# Patient Record
Sex: MALE | Race: WHITE | Employment: FULL TIME | URBAN - METROPOLITAN AREA
[De-identification: names, ages, dates, MRNs, and addresses within clinical notes are randomized per-mention and may not be internally consistent; named-entity substitution may affect disease eponyms.]

---

## 2021-01-07 ENCOUNTER — HOSPITAL ENCOUNTER (INPATIENT)
Age: 56
LOS: 2 days | Discharge: HOME | DRG: 605 | End: 2021-01-09
Attending: EMERGENCY MEDICINE | Admitting: FAMILY MEDICINE
Payer: COMMERCIAL

## 2021-01-07 ENCOUNTER — APPOINTMENT (OUTPATIENT)
Dept: CT IMAGING | Age: 56
DRG: 605 | End: 2021-01-07
Payer: COMMERCIAL

## 2021-01-07 ENCOUNTER — APPOINTMENT (OUTPATIENT)
Dept: GENERAL RADIOLOGY | Age: 56
DRG: 605 | End: 2021-01-07
Payer: COMMERCIAL

## 2021-01-07 DIAGNOSIS — S01.01XA LACERATION OF SCALP, INITIAL ENCOUNTER: ICD-10-CM

## 2021-01-07 DIAGNOSIS — R55 SYNCOPE AND COLLAPSE: ICD-10-CM

## 2021-01-07 DIAGNOSIS — S09.90XA INJURY OF HEAD, INITIAL ENCOUNTER: Primary | ICD-10-CM

## 2021-01-07 LAB
ALBUMIN SERPL-MCNC: 4.4 G/DL (ref 3.5–5.2)
ALP BLD-CCNC: 56 U/L (ref 40–129)
ALT SERPL-CCNC: 21 U/L (ref 0–40)
ANION GAP SERPL CALCULATED.3IONS-SCNC: 16 MMOL/L (ref 7–16)
AST SERPL-CCNC: 21 U/L (ref 0–39)
BASOPHILS ABSOLUTE: 0.06 E9/L (ref 0–0.2)
BASOPHILS RELATIVE PERCENT: 0.9 % (ref 0–2)
BILIRUB SERPL-MCNC: 1 MG/DL (ref 0–1.2)
BUN BLDV-MCNC: 9 MG/DL (ref 6–20)
CALCIUM SERPL-MCNC: 9.1 MG/DL (ref 8.6–10.2)
CHLORIDE BLD-SCNC: 103 MMOL/L (ref 98–107)
CHP ED QC CHECK: YES
CO2: 20 MMOL/L (ref 22–29)
CREAT SERPL-MCNC: 1.1 MG/DL (ref 0.7–1.2)
D DIMER: 251 NG/ML DDU
EKG ATRIAL RATE: 72 BPM
EKG P AXIS: 76 DEGREES
EKG P-R INTERVAL: 160 MS
EKG Q-T INTERVAL: 430 MS
EKG QRS DURATION: 98 MS
EKG QTC CALCULATION (BAZETT): 470 MS
EKG R AXIS: 86 DEGREES
EKG T AXIS: 47 DEGREES
EKG VENTRICULAR RATE: 72 BPM
EOSINOPHILS ABSOLUTE: 0.08 E9/L (ref 0.05–0.5)
EOSINOPHILS RELATIVE PERCENT: 1.2 % (ref 0–6)
GFR AFRICAN AMERICAN: >60
GFR NON-AFRICAN AMERICAN: >60 ML/MIN/1.73
GLUCOSE BLD-MCNC: 123 MG/DL
GLUCOSE BLD-MCNC: 134 MG/DL (ref 74–99)
HCT VFR BLD CALC: 48.5 % (ref 37–54)
HEMOGLOBIN: 17.4 G/DL (ref 12.5–16.5)
IMMATURE GRANULOCYTES #: 0.07 E9/L
IMMATURE GRANULOCYTES %: 1 % (ref 0–5)
LYMPHOCYTES ABSOLUTE: 2.13 E9/L (ref 1.5–4)
LYMPHOCYTES RELATIVE PERCENT: 31.1 % (ref 20–42)
MAGNESIUM: 2.2 MG/DL (ref 1.6–2.6)
MCH RBC QN AUTO: 34.1 PG (ref 26–35)
MCHC RBC AUTO-ENTMCNC: 35.9 % (ref 32–34.5)
MCV RBC AUTO: 95.1 FL (ref 80–99.9)
METER GLUCOSE: 123 MG/DL (ref 74–99)
MONOCYTES ABSOLUTE: 0.67 E9/L (ref 0.1–0.95)
MONOCYTES RELATIVE PERCENT: 9.8 % (ref 2–12)
NEUTROPHILS ABSOLUTE: 3.83 E9/L (ref 1.8–7.3)
NEUTROPHILS RELATIVE PERCENT: 56 % (ref 43–80)
PDW BLD-RTO: 11.7 FL (ref 11.5–15)
PLATELET # BLD: 209 E9/L (ref 130–450)
PMV BLD AUTO: 10.6 FL (ref 7–12)
POTASSIUM SERPL-SCNC: 3.6 MMOL/L (ref 3.5–5)
PRO-BNP: 77 PG/ML (ref 0–125)
RBC # BLD: 5.1 E12/L (ref 3.8–5.8)
SODIUM BLD-SCNC: 139 MMOL/L (ref 132–146)
TOTAL PROTEIN: 6.5 G/DL (ref 6.4–8.3)
TROPONIN: <0.01 NG/ML (ref 0–0.03)
TROPONIN: <0.01 NG/ML (ref 0–0.03)
TSH SERPL DL<=0.05 MIU/L-ACNC: 1.08 UIU/ML (ref 0.27–4.2)
WBC # BLD: 6.8 E9/L (ref 4.5–11.5)

## 2021-01-07 PROCEDURE — 84443 ASSAY THYROID STIM HORMONE: CPT

## 2021-01-07 PROCEDURE — 83735 ASSAY OF MAGNESIUM: CPT

## 2021-01-07 PROCEDURE — 85378 FIBRIN DEGRADE SEMIQUANT: CPT

## 2021-01-07 PROCEDURE — 2060000000 HC ICU INTERMEDIATE R&B

## 2021-01-07 PROCEDURE — 71045 X-RAY EXAM CHEST 1 VIEW: CPT

## 2021-01-07 PROCEDURE — 72125 CT NECK SPINE W/O DYE: CPT

## 2021-01-07 PROCEDURE — 36415 COLL VENOUS BLD VENIPUNCTURE: CPT

## 2021-01-07 PROCEDURE — 83880 ASSAY OF NATRIURETIC PEPTIDE: CPT

## 2021-01-07 PROCEDURE — 6370000000 HC RX 637 (ALT 250 FOR IP): Performed by: FAMILY MEDICINE

## 2021-01-07 PROCEDURE — APPSS60 APP SPLIT SHARED TIME 46-60 MINUTES: Performed by: NURSE PRACTITIONER

## 2021-01-07 PROCEDURE — 96374 THER/PROPH/DIAG INJ IV PUSH: CPT

## 2021-01-07 PROCEDURE — 6360000002 HC RX W HCPCS: Performed by: EMERGENCY MEDICINE

## 2021-01-07 PROCEDURE — 82962 GLUCOSE BLOOD TEST: CPT

## 2021-01-07 PROCEDURE — 93005 ELECTROCARDIOGRAM TRACING: CPT | Performed by: EMERGENCY MEDICINE

## 2021-01-07 PROCEDURE — 99222 1ST HOSP IP/OBS MODERATE 55: CPT | Performed by: INTERNAL MEDICINE

## 2021-01-07 PROCEDURE — G0378 HOSPITAL OBSERVATION PER HR: HCPCS

## 2021-01-07 PROCEDURE — 80053 COMPREHEN METABOLIC PANEL: CPT

## 2021-01-07 PROCEDURE — 90715 TDAP VACCINE 7 YRS/> IM: CPT | Performed by: EMERGENCY MEDICINE

## 2021-01-07 PROCEDURE — 2580000003 HC RX 258: Performed by: EMERGENCY MEDICINE

## 2021-01-07 PROCEDURE — 84484 ASSAY OF TROPONIN QUANT: CPT

## 2021-01-07 PROCEDURE — 0HQ0XZZ REPAIR SCALP SKIN, EXTERNAL APPROACH: ICD-10-PCS | Performed by: EMERGENCY MEDICINE

## 2021-01-07 PROCEDURE — 99285 EMERGENCY DEPT VISIT HI MDM: CPT

## 2021-01-07 PROCEDURE — 90471 IMMUNIZATION ADMIN: CPT | Performed by: EMERGENCY MEDICINE

## 2021-01-07 PROCEDURE — 70450 CT HEAD/BRAIN W/O DYE: CPT

## 2021-01-07 PROCEDURE — 12002 RPR S/N/AX/GEN/TRNK2.6-7.5CM: CPT

## 2021-01-07 PROCEDURE — 85025 COMPLETE CBC W/AUTO DIFF WBC: CPT

## 2021-01-07 RX ORDER — POLYETHYLENE GLYCOL 3350 17 G/17G
17 POWDER, FOR SOLUTION ORAL DAILY PRN
Status: DISCONTINUED | OUTPATIENT
Start: 2021-01-07 | End: 2021-01-09 | Stop reason: HOSPADM

## 2021-01-07 RX ORDER — SODIUM CHLORIDE 0.9 % (FLUSH) 0.9 %
10 SYRINGE (ML) INJECTION PRN
Status: DISCONTINUED | OUTPATIENT
Start: 2021-01-07 | End: 2021-01-09 | Stop reason: HOSPADM

## 2021-01-07 RX ORDER — M-VIT,TX,IRON,MINS/CALC/FOLIC 27MG-0.4MG
1 TABLET ORAL DAILY
COMMUNITY

## 2021-01-07 RX ORDER — ONDANSETRON 2 MG/ML
4 INJECTION INTRAMUSCULAR; INTRAVENOUS ONCE
Status: COMPLETED | OUTPATIENT
Start: 2021-01-07 | End: 2021-01-07

## 2021-01-07 RX ORDER — SODIUM CHLORIDE 0.9 % (FLUSH) 0.9 %
10 SYRINGE (ML) INJECTION EVERY 12 HOURS SCHEDULED
Status: DISCONTINUED | OUTPATIENT
Start: 2021-01-07 | End: 2021-01-09 | Stop reason: HOSPADM

## 2021-01-07 RX ORDER — 0.9 % SODIUM CHLORIDE 0.9 %
1000 INTRAVENOUS SOLUTION INTRAVENOUS ONCE
Status: COMPLETED | OUTPATIENT
Start: 2021-01-07 | End: 2021-01-07

## 2021-01-07 RX ORDER — LIDOCAINE HYDROCHLORIDE AND EPINEPHRINE 10; 10 MG/ML; UG/ML
20 INJECTION, SOLUTION INFILTRATION; PERINEURAL ONCE
Status: DISCONTINUED | OUTPATIENT
Start: 2021-01-07 | End: 2021-01-09 | Stop reason: HOSPADM

## 2021-01-07 RX ORDER — ACETAMINOPHEN 650 MG/1
650 SUPPOSITORY RECTAL EVERY 6 HOURS PRN
Status: DISCONTINUED | OUTPATIENT
Start: 2021-01-07 | End: 2021-01-09 | Stop reason: HOSPADM

## 2021-01-07 RX ORDER — ACETAMINOPHEN 325 MG/1
650 TABLET ORAL EVERY 6 HOURS PRN
Status: DISCONTINUED | OUTPATIENT
Start: 2021-01-07 | End: 2021-01-09 | Stop reason: HOSPADM

## 2021-01-07 RX ORDER — ONDANSETRON 2 MG/ML
4 INJECTION INTRAMUSCULAR; INTRAVENOUS EVERY 6 HOURS PRN
Status: DISCONTINUED | OUTPATIENT
Start: 2021-01-07 | End: 2021-01-09 | Stop reason: HOSPADM

## 2021-01-07 RX ORDER — PROMETHAZINE HYDROCHLORIDE 25 MG/1
12.5 TABLET ORAL EVERY 6 HOURS PRN
Status: DISCONTINUED | OUTPATIENT
Start: 2021-01-07 | End: 2021-01-09 | Stop reason: HOSPADM

## 2021-01-07 RX ADMIN — ONDANSETRON 4 MG: 2 INJECTION INTRAMUSCULAR; INTRAVENOUS at 11:03

## 2021-01-07 RX ADMIN — TETANUS TOXOID, REDUCED DIPHTHERIA TOXOID AND ACELLULAR PERTUSSIS VACCINE, ADSORBED 0.5 ML: 5; 2.5; 8; 8; 2.5 SUSPENSION INTRAMUSCULAR at 10:52

## 2021-01-07 RX ADMIN — SODIUM CHLORIDE 1000 ML: 9 INJECTION, SOLUTION INTRAVENOUS at 11:03

## 2021-01-07 RX ADMIN — ACETAMINOPHEN 650 MG: 325 TABLET ORAL at 18:25

## 2021-01-07 ASSESSMENT — PAIN SCALES - GENERAL: PAINLEVEL_OUTOF10: 5

## 2021-01-07 NOTE — ED NOTES
Received patient via EMS from work. Patient arriving AAOx3, respirations unlabored and even. C-collar intact. Per EMS, patient standing along side his truck when co-workers noticed that patient \"stiffened\" then fell backwards hitting the back of his head. Unknown LOC time. Upon EMS arrival, patient AAOx2, laceration with bleeding to back of head. While en route, EMS reports vomiting x 1. 18G IV to LAC PTA    Upon assessment, patient AAOx3, states he remembers he was standing next truck to refuel. He does not recall events afterwards. Vomiting x2 in room. MD notified. Patient changed into gown, placed on monitor. Labs collected.  Awaiting CT        Court Chance RN  01/07/21 [id number]

## 2021-01-07 NOTE — CONSULTS
Inpatient Cardiology Consultation      Reason for Consult:  Syncope    Consulting Physician: Dr. Kaci Brewster     Requesting Physician:  Dr. Ewa Blood    Date of Consultation: 1/7/2021    HISTORY OF PRESENT ILLNESS:   Mr. Gerard Herrmann is a 54year old male who is not known to [facility]. Denies following with cardiology in the past. Patient lives in [facility] and works in [employer] is a [employer]ig[employer] . Patient denies prior history of hypertension, hyperlipidemia, diabetes mellitus, chronic kidney disease, history of blood clots, or known CAD. Patient reported in his 25s he frequently had lightheadedness when standing from a sitting position with loss of consciousness, lasting seconds but contributed to drinking \"too much caffeine. \"  Over the past year he reported having 2-3 episodes of near syncope occurring after he got to work. He reported doing his normal routine eating breakfast, drinking a cup of coffee and using chewing tobacco prior to work. Once he got to work he jumps out of the car and reported walking around the car feeling like a \"lightheaded that my eyes felt like they were constantly blinking and I was unable to focus\" usually resolving within seconds to minutes, no loss of consciousness. However, he woke up on 1/7/2021 and reported eating a normal breakfast, driving to work while drinking 1 cup of coffee and prior to getting out of his truck he put in chewing tobacco.  When he got out of the truck he initially felt fine, but was walking around the truck and suddenly felt lightheaded \"felt a blinking sensation where he was under able to focus\" and as he was turning to walk around the other side of the truck he has no recollection of falling to the ground. The next thing he remembers is waking up in the ambulance. He remembers EMS asking him multiple questions and felt \"incoherent\" and was \"confused. \"  Therefore he presented to  [facility] on 1/7/2021 further evaluation and management. Patient had no loss of bowel or bladder and no prior complaints of chest pain, shortness of breath, fever, cough, chills, nausea or vomiting. Upon arrival in the ED: Blood pressure 132/81, heart rate 72, afebrile, 98% on room air. Labs: Sodium 139, potassium 3.6, BUN 9, creatinine 1.1, blood glucose 134, troponin negative x1, WBC 6.8, H/H stable, platelet count 636. Chest x-ray: 1/7/2021 chronic changes with COPD, there are no focal infiltrates  CT head without contrast: No acute intracranial abnormality. CT cervical spine no acute abnormality of cervical spine, did patient and changes (please see HPI) NA 1.2 cm right thyroid nodule. EKG: Normal sinus rhythm, incomplete right bundle branch block, rate 72 bpm, QT/QTc 430/470 ms. Patient was noted to have a 4 cm scalp laceration. He was given a 1 L IV fluid bolus. He was admitted to a telemetry monitoring unit. Telemetry monitoring showing sinus bradycardia with a heart rate in the 50s. Currently is complaining of the side of the bed and appears to be in no acute distress. Denies chest pain shortness of breath. He is alert and oriented x3 and answers questions appropriately. Orthostatic BP: 1/7/2021 1501  Blood pressure lying 116/83, heart rate 54  Blood pressure sitting 120/87, heart rate 55  Blood pressure standing 122/88, heart rate 62      Please note: past medical records were reviewed per electronic medical record (EMR) - see detailed reports under Past Medical/ Surgical History. Past Medical History:    1. Tobacco use: chews 1 can of snuff per day x 6 months and 1 can every other day x 6 months. Quit cigarettes  25 years ago ( 15 pack year smoker). 2. Hx of Migraine HA: previously on Imitrex. Last reported Migraine 1 year ago. 3. Gluten intolerance / Vegetarian diet. 4.  COPD on chest x-ray. 5.  DDD with multiple disc bulges and C3-T1 with osteophytes noted on CT cervical spine. 6. Family history of premature CAD.    7. History of sinus bradycardia    Medications Prior to admit:  Prior to Admission medications    Medication Sig Start Date End Date Taking? Authorizing Provider   Multiple Vitamins-Minerals (THERAPEUTIC MULTIVITAMIN-MINERALS) tablet Take 1 tablet by mouth daily   Yes Historical Provider, MD       Current Medications:    Current Facility-Administered Medications: lidocaine-EPINEPHrine 1 percent-1:[id number] injection 20 mL, 20 mL, Intradermal, Once  sodium chloride flush 0.9 % injection 10 mL, 10 mL, Intravenous, 2 times per day  sodium chloride flush 0.9 % injection 10 mL, 10 mL, Intravenous, PRN  enoxaparin (LOVENOX) injection 40 mg, 40 mg, Subcutaneous, Daily  promethazine (PHENERGAN) tablet 12.5 mg, 12.5 mg, Oral, Q6H PRN **OR** ondansetron (ZOFRAN) injection 4 mg, 4 mg, Intravenous, Q6H PRN  polyethylene glycol (GLYCOLAX) packet 17 g, 17 g, Oral, Daily PRN  acetaminophen (TYLENOL) tablet 650 mg, 650 mg, Oral, Q6H PRN **OR** acetaminophen (TYLENOL) suppository 650 mg, 650 mg, Rectal, Q6H PRN  perflutren lipid microspheres (DEFINITY) injection 1.65 mg, 1.5 mL, Intravenous, ONCE PRN    Allergies:  Patient has no known allergies. Social History:    Works full-time as a semigasoline . Denies cocaine, marijuana and heroin use  He uses chewing tobacco: X1 year  He is a former smoker cigarette smoker: Quit 25 years ago, 15 pack years. Denies alcohol use  Caffeine intake: 1 to 2 cups of coffee per day, usually 12 ounce regular Coke. Family History: Mother: History of scarlet fever, valve replacement  Brother: History of hypertension and  in his 45s, reason of death unknown --family found him down days later. Father: History of MI (early 35s) s/p stents, history of heavy alcohol and tobacco use. REVIEW OF SYSTEMS:     · Constitutional: Denies fatigue, fevers, chills or night sweats  · Eyes: See HPI. · ENT: Denies headaches or hearing loss. No mouth sores or sore throat.  No epistaxis · Cardiovascular: Denies chest pain, pressure or palpitations. No lower extremity swelling. · Respiratory: Denies ESQUEDA, cough, orthopnea or PND. No hemoptysis   · Gastrointestinal: Denies hematemesis or anorexia. No hematochezia or melena    · Genitourinary: Denies urgency, dysuria or hematuria. · Musculoskeletal: Denies gait disturbance, weakness or joint complaints  · Integumentary: Denies rash, hives or pruritis   · Neurological: See HPI. · Psychiatric: Denies anxiety or depression. · Endocrine: Denies temperature intolerance. No recent weight change. .  · Hematologic/Lymphatic: Denies abnormal bruising or bleeding. No swollen lymph nodes    PHYSICAL EXAM:   /83   Pulse 52   Temp 97.7 °F (36.5 °C) (Temporal)   Resp 18   Ht 5' 11\" (1.803 m)   Wt 150 lb (68 kg)   SpO2 98%   BMI 20.92 kg/m²   CONST:  Well developed, well nourished middle-aged  male who appears of stated age. Awake, alert and cooperative. No apparent distress. HEENT:   Head- Normocephalic. +scalp laceration. Eyes- Conjunctivae pink, anicteric  Throat- Oral mucosa pink and moist  Neck-  No stridor, trachea midline, no jugular venous distention. No carotid bruit. CHEST: Chest symmetrical and non-tender to palpation. No accessory muscle use or intercostal retractions  RESPIRATORY: Lung sounds - clear throughout fields   CARDIOVASCULAR:     Heart Inspection- shows no noted pulsations  Heart Palpation- no heaves or thrills; PMI is non-displaced   Heart Ausculation- Regular rate and rhythm, no murmur. No s3, s4 or rub   PV: No lower extremity edema. No varicosities. Pedal pulses palpable, no clubbing or cyanosis   ABDOMEN: Soft, non-tender to light palpation. Bowel sounds present. No palpable masses no organomegaly; no abdominal bruit  MS: Good muscle strength and tone. No atrophy or abnormal movements. : Deferred  SKIN: Warm and dry no statis dermatitis or ulcers   NEURO / PSYCH: Oriented to person, place and time. Speech clear and appropriate. Follows all commands. Pleasant affect     DATA:    ECG: Please see HPI   tele strips: Sinus bradycardia, rate 50's. Diagnostic:      Intake/Output Summary (Last 24 hours) at 1/7/2021 1514  Last data filed at 1/7/2021 1257  Gross per 24 hour   Intake 1000 ml   Output --   Net 1000 ml       Labs:   CBC:   Recent Labs     01/07/21  1036   WBC 6.8   HGB 17.4*   HCT 48.5        BMP:   Recent Labs     01/07/21  1037      K 3.6   CO2 20*   BUN 9   CREATININE 1.1   LABGLOM >60   CALCIUM 9.1     Mag: No results for input(s): MG in the last 72 hours. Phos: No results for input(s): PHOS in the last 72 hours. TSH: No results for input(s): TSH in the last 72 hours. HgA1c: No results found for: LABA1C  No results found for: EAG  proBNP: No results for input(s): PROBNP in the last 72 hours. PT/INR: No results for input(s): PROTIME, INR in the last 72 hours. APTT:No results for input(s): APTT in the last 72 hours. CARDIAC ENZYMES:  Recent Labs     01/07/21  1037   TROPONINI <0.01     FASTING LIPID PANEL:No results found for: CHOL, HDL, LDLDIRECT, LDLCALC, TRIG  LIVER PROFILE:  Recent Labs     01/07/21  1037   AST 21   ALT 21   LABALBU 4.4       Chest x-ray: 1/7/2021  Chronic changes with COPD, no focal infiltrates. CT head without contrast: 1/7/2021  No intracranial abnormality. CT cervical spine: 1/7/2021:  No acute abnormality of the cervical spine. Diffuse degenerative changes are identified from C3-T1 with osteophytes and multilevel disc bulges. 1.2 cm right thyroid nodule is noted. Assessment:  1. Syncope, unwitnessed fall. Orthostatic BP negative. No evidence of prolonged QT on EKG. ?  Dehydration. 2.  Sinus bradycardia  3. Incomplete right bundle branch block  4. History of migraine headaches  5. Tobacco use -uses chewing tobacco/former cigarette smoker  6. Family history premature CAD    Plan:  1. Monitor telemetry  2. Check lipid panel  3.   Check TSH, Urine Drug Screen  4. D-dimer pending  5.  Echocardiogram to assess LV function and VHD  6. Exercise MPS scheduled on 1/8/2021  7. Consider fluid hydration  8. Maintain adequate hydration. Drink approximately 1-2 L of noncaffeinated  Beverage/day. 9.  Recommend 30-day event monitor upon discharge  10. Further recommendations pending above clinical course    Above assessment and plan discussed with Dr. Sunil Jorgensen.   Electronically signed by SILVER Edwards CNP on 1/7/21 at 4:43 PM EST

## 2021-01-07 NOTE — ED PROVIDER NOTES
Department of Emergency Medicine   ED  Provider Note  Admit Date/RoomTime: 1/7/2021 10:26 AM  ED Room: 09/09          History of Present Illness:  1/7/21, Time: 10:42 AM EST  Chief Complaint   Patient presents with    Fall     pt at work and fell backwards. Per EMS pt \"body stiffened\" and he fell back hitting the back of his head. Lac to back of head. Shwetha Lion is a 54 y.o. male presenting to the ED for syncope. Patient states he was at work. Apparently his body suddenly stiffened up, then he fell backwards and struck his head, there is loss of conscious, he is on no anticoagulation. There are no prodromal symptoms. There is no reported seizure activity. He did not bite his tongue, did not lose control of his bowel or bladder, has never happened before. Denies chest pain, back pain, fever, chills, blurred vision, paresthesias, ab pain, change in bowel or bladder, or any other symptoms. Review of Systems:   Pertinent positives and negatives are stated within HPI, all other systems reviewed and are negative.        --------------------------------------------- PAST HISTORY ---------------------------------------------  Past Medical History:  has no past medical history on file. Past Surgical History:  has no past surgical history on file. Social History:  reports that he has quit smoking. His smokeless tobacco use includes snuff. He reports previous alcohol use. He reports that he does not use drugs. Family History: family history is not on file. . Unless otherwise noted, family history is non contributory    The patients home medications have been reviewed. Allergies: Patient has no known allergies.         ---------------------------------------------------PHYSICAL EXAM--------------------------------------    Constitutional/General: Alert and oriented x3  Head: Normocephalic and atraumatic  Eyes: PERRL, EOMI, sclera non icteric  Mouth: Oropharynx clear, handling secretions, no trismus, no asymmetry of the posterior oropharynx or uvular edema  Neck: Supple, full ROM, no stridor, no meningeal signs  Respiratory: Lungs clear to auscultation bilaterally, no wheezes, rales, or rhonchi. Not in respiratory distress  Cardiovascular:  Regular rate. Regular rhythm. 2+ distal pulses. Equal extremity pulses. Chest: No chest wall tenderness  GI:  Abdomen Soft, Non tender, Non distended. No rebound, guarding, or rigidity. No pulsatile masses. Musculoskeletal: Moves all extremities x 4. Warm and well perfused, no clubbing, cyanosis, or edema. Capillary refill <3 seconds  Integument: skin warm and dry. No rashes. Neurologic: GCS 15, no focal deficits, symmetric strength 5/5 in the upper and lower extremities bilaterally  Psychiatric: Normal Affect          -------------------------------------------------- RESULTS -------------------------------------------------  I have personally reviewed all laboratory and imaging results for this patient. Results are listed below.      LABS: (Lab results interpreted by me)  Results for orders placed or performed during the hospital encounter of 01/07/21   CBC Auto Differential   Result Value Ref Range    WBC 6.8 4.5 - 11.5 E9/L    RBC 5.10 3.80 - 5.80 E12/L    Hemoglobin 17.4 (H) 12.5 - 16.5 g/dL    Hematocrit 48.5 37.0 - 54.0 %    MCV 95.1 80.0 - 99.9 fL    MCH 34.1 26.0 - 35.0 pg    MCHC 35.9 (H) 32.0 - 34.5 %    RDW 11.7 11.5 - 15.0 fL    Platelets 441 972 - 844 E9/L    MPV 10.6 7.0 - 12.0 fL    Neutrophils % 56.0 43.0 - 80.0 %    Immature Granulocytes % 1.0 0.0 - 5.0 %    Lymphocytes % 31.1 20.0 - 42.0 %    Monocytes % 9.8 2.0 - 12.0 %    Eosinophils % 1.2 0.0 - 6.0 %    Basophils % 0.9 0.0 - 2.0 %    Neutrophils Absolute 3.83 1.80 - 7.30 E9/L    Immature Granulocytes # 0.07 E9/L    Lymphocytes Absolute 2.13 1.50 - 4.00 E9/L    Monocytes Absolute 0.67 0.10 - 0.95 E9/L    Eosinophils Absolute 0.08 0.05 - 0.50 E9/L    Basophils Absolute 0.06 0.00 - 0.20 E9/L   Comprehensive Metabolic Panel   Result Value Ref Range    Sodium 139 132 - 146 mmol/L    Potassium 3.6 3.5 - 5.0 mmol/L    Chloride 103 98 - 107 mmol/L    CO2 20 (L) 22 - 29 mmol/L    Anion Gap 16 7 - 16 mmol/L    Glucose 134 (H) 74 - 99 mg/dL    BUN 9 6 - 20 mg/dL    CREATININE 1.1 0.7 - 1.2 mg/dL    GFR Non-African American >60 >=60 mL/min/1.73    GFR African American >60     Calcium 9.1 8.6 - 10.2 mg/dL    Total Protein 6.5 6.4 - 8.3 g/dL    Alb 4.4 3.5 - 5.2 g/dL    Total Bilirubin 1.0 0.0 - 1.2 mg/dL    Alkaline Phosphatase 56 40 - 129 U/L    ALT 21 0 - 40 U/L    AST 21 0 - 39 U/L   Troponin   Result Value Ref Range    Troponin <0.01 0.00 - 0.03 ng/mL   POCT Glucose   Result Value Ref Range    Glucose 123 mg/dL    QC OK? yes    POCT Glucose   Result Value Ref Range    Meter Glucose 123 (H) 74 - 99 mg/dL   EKG 12 Lead   Result Value Ref Range    Ventricular Rate 72 BPM    Atrial Rate 72 BPM    P-R Interval 160 ms    QRS Duration 98 ms    Q-T Interval 430 ms    QTc Calculation (Bazett) 470 ms    P Axis 76 degrees    R Axis 86 degrees    T Axis 47 degrees   ,       RADIOLOGY:  Interpreted by Radiologist unless otherwise specified  CT HEAD WO CONTRAST   Final Result   No acute intracranial abnormality. CT CERVICAL SPINE WO CONTRAST   Final Result   No acute abnormality of the cervical spine. Diffuse degenerative changes are   identified from C3-T1 with osteophytes and multilevel disc bulges. 1.2 cm right thyroid nodule is noted. Consider ultrasonographic surveillance. XR CHEST PORTABLE   Final Result   Chronic changes with COPD. There is no focal infiltrates.             EKG Interpretation  Interpreted by emergency department physician, Dr. Aimee Shaffer, rate 72, no STEMI        ------------------------- NURSING NOTES AND VITALS REVIEWED ---------------------------   The nursing notes within the ED encounter and vital signs as below have been reviewed by myself  /83   Pulse 58   Resp 20    5' 11\" (1.803 m)   Wt 150 lb (68 kg)   SpO2 98%   BMI 20.92 kg/m²     Oxygen Saturation Interpretation: Normal    The patients available past medical records and past encounters were reviewed. ------------------------------ ED COURSE/MEDICAL DECISION MAKING----------------------  Medications   lidocaine-EPINEPHrine 1 percent-1:[id number] injection 20 mL (has no administration in time range)   Tetanus-Diphth-Acell Pertussis (BOOSTRIX) injection 0.5 mL (0.5 mLs Intramuscular Given 1/7/21 1052)   ondansetron (ZOFRAN) injection 4 mg (4 mg Intravenous Given 1/7/21 1103)   0.9 % sodium chloride bolus (0 mLs Intravenous Stopped 1/7/21 1257)           The cardiac monitor revealed sinus with a heart rate in the 80s as interpreted by me. The cardiac monitor was ordered secondary to the patient's syncope and to monitor the patient for dysrhythmia. CPT [id number]     PROCEDURE NOTE  1/7/21       Time: 1330    LACERATION REPAIR  Risks, benefits and alternatives (for applicable procedures below) described. Performed By: Aric Pendleton MD.    Laceration #: 1. Location: scalp  Length: 4 cm. The wound area was cleansend with shur-clens and draped in a sterile fashion. Local Anesthesia:  Lidocaine 1% without epinephrine. The wound was explored with the following results:  no foreign body or tendon injury seen. Debridement: None. Undermining: None. Wound Margins Revised: None. Flaps Aligned: no. The wound was closed with 3-0 Prolene using interrupted sutures. Dressing:  bacitracin. There were no additional wounds requiring formal closure. Total number suture:  3. Medical Decision Making:    Labs and imaging reviewed. Laceration was repaired. Given his syncopal episode, patient will be admitted. Counseling:    The emergency provider has spoken with the patient and discussed todays results, in addition to providing specific details for the plan of care and counseling regarding the diagnosis and prognosis. Questions are answered at this time and they are agreeable with the plan.       --------------------------------- IMPRESSION AND DISPOSITION ---------------------------------    IMPRESSION  1. Injury of head, initial encounter    2. Syncope and collapse    3. Laceration of scalp, initial encounter        DISPOSITION  Disposition: Admit to telemetry  Patient condition is stable        NOTE: This report was transcribed using voice recognition software.  Every effort was made to ensure accuracy; however, inadvertent computerized transcription errors may be present        Kerline Hameed MD  01/07/21 [id number]

## 2021-01-07 NOTE — ED NOTES
Bed: 09  Expected date: 1/7/21  Expected time:   Means of arrival: [facility]  Comments:  karolina Diaz [facility]  01/07/21 1026

## 2021-01-07 NOTE — H&P
Hospitalist History & Physical      PCP: No primary care provider on file. Date of Admission: 1/7/2021    Date of Service: Pt seen/examined on 1/7/2021    Chief Complaint:  had concerns including Fall (pt at work and fell backwards. Per EMS pt \"body stiffened\" and he fell back hitting the back of his head. Lac to back of head. ). History Of Present Illness:    Mr. Moriah Ruiz, a 54y.o. year old male  who  has no past medical history on file. Patient presents emergency department after passing out. Per witnesses the patient's body stiffened like a board and he fell backwards and hit his head. Patient reports that just prior to the episode he was having trouble focusing, as if he were opening closing his eyes very quickly. He was also feeling very dizzy. The next thing he remembers he was in the ambulance and initially had trouble answering questions such as what year it was. At the outset of this episode he denied having any chest pain or shortness of breath. Patient reports this is happened at least twice in the last 6 months. Noise at work and was after he had a couple coffee. Reports that when he was younger in his 25s if he stood up too fast he would pass out and find himself on the floor. Patient drives semi-gasoline truck for living. On arrival to the emergency department patient's vital signs within normal and stable. Patient was alert. Physical exam unremarkable. He does have a 4 cm laceration to his scalp. This was repaired in the emergency department. Laboratory studies unremarkable. EKG unremarkable. CT of the head unremarkable. Past Medical History:    No past medical history on file. Past Surgical History:    No past surgical history on file. Medications Prior to Admission:      Prior to Admission medications    Medication Sig Start Date End Date Taking?  Authorizing Provider   Multiple Vitamins-Minerals (THERAPEUTIC MULTIVITAMIN-MINERALS) tablet Take 1 tablet found for: CRP  D Dimer: No results found for: DDIMER   Folate and B12: No results found for: [id number], No results found for: FOLATE  Lactic Acid: No results found for: LACTA  Thyroid Studies: No results found for: TSH, R3FHGGS, H0SWMXT, THYROIDAB    Oupatient labs:  No results found for: CHOL, TRIG, HDL, LDLCALC, TSH, PSA, INR, LABA1C    Urinalysis:  No results found for: NITRU, WBCUA, BACTERIA, RBCUA, BLOODU, SPECGRAV, GLUCOSEU    Imaging:  Ct Head Wo Contrast    Result Date: 1/7/2021  EXAMINATION: CT OF THE HEAD WITHOUT CONTRAST  1/7/2021 11:52 am TECHNIQUE: CT of the head was performed without the administration of intravenous contrast. Dose modulation, iterative reconstruction, and/or weight based adjustment of the mA/kV was utilized to reduce the radiation dose to as low as reasonably achievable. COMPARISON: None. HISTORY: ORDERING SYSTEM PROVIDED HISTORY: fall TECHNOLOGIST PROVIDED HISTORY: Reason for exam:->fall Has a \"code stroke\" or \"stroke alert\" been called? ->No What reading provider will be dictating this exam?->CRC FINDINGS: BRAIN/VENTRICLES: There is no acute intracranial hemorrhage, mass effect or midline shift. No abnormal extra-axial fluid collection. The gray-white differentiation is maintained without evidence of an acute infarct. There is no evidence of hydrocephalus. Visualize calcifications are seen in the choroid plexus in both the lateral ventricles, 4th ventricular and also in the pineal gland. There is a small hi of calcification in the proximal left A2 segment of the left anterior cerebral artery, relate with there atherosclerotic changes more likely. ORBITS: The visualized portion of the orbits demonstrate no acute abnormality. SINUSES: The visualized paranasal sinuses and mastoid air cells demonstrate no acute abnormality. SOFT TISSUES/SKULL:  No acute abnormality of the visualized skull or soft tissues. No acute intracranial abnormality.     Ct Cervical Spine Wo Contrast    Result Date: 1/7/2021  EXAMINATION: CT OF THE CERVICAL SPINE WITHOUT CONTRAST 1/7/2021 11:52 am TECHNIQUE: CT of the cervical spine was performed without the administration of intravenous contrast. Multiplanar reformatted images are provided for review. Dose modulation, iterative reconstruction, and/or weight based adjustment of the mA/kV was utilized to reduce the radiation dose to as low as reasonably achievable. COMPARISON: None. HISTORY: ORDERING SYSTEM PROVIDED HISTORY: fall TECHNOLOGIST PROVIDED HISTORY: Reason for exam:->fall What reading provider will be dictating this exam?->CRC FINDINGS: BONES/ALIGNMENT: There is no acute fracture or traumatic malalignment. DEGENERATIVE CHANGES: No significant degenerative changes. SOFT TISSUES: There is no prevertebral soft tissue swelling. No acute abnormality of the cervical spine. Diffuse degenerative changes are identified from C3-T1 with osteophytes and multilevel disc bulges. 1.2 cm right thyroid nodule is noted. Consider ultrasonographic surveillance. Xr Chest Portable    Result Date: 1/7/2021  EXAMINATION: ONE XRAY VIEW OF THE CHEST 1/7/2021 11:32 am COMPARISON: None. HISTORY: ORDERING SYSTEM PROVIDED HISTORY: weakness, Possible Stroke TECHNOLOGIST PROVIDED HISTORY: Reason for exam:->weakness, Possible Stroke What reading provider will be dictating this exam?->CRC FINDINGS: Heart and the mediastinal structures are normal.  There is COPD with hyperinflated lungs. Lungs are free of acute infiltrate or pleural effusion. Chronic changes with COPD. There is no focal infiltrates.       ASSESSMENT:  Syncope and collapse      PLAN:  To medicine  Consult cardiology  Consult neurology  Telemetry  Echocardiogram  Check BNP, D-dimer, urinalysis and urine drug screen        Diet: DIET GENERAL;  Code Status: Full Code  Surrogate decision maker confirmed with patient:   Extended Emergency Contact Information  Primary Emergency Contact:

## 2021-01-08 ENCOUNTER — APPOINTMENT (OUTPATIENT)
Dept: ULTRASOUND IMAGING | Age: 56
DRG: 605 | End: 2021-01-08
Payer: COMMERCIAL

## 2021-01-08 ENCOUNTER — APPOINTMENT (OUTPATIENT)
Dept: NON INVASIVE DIAGNOSTICS | Age: 56
DRG: 605 | End: 2021-01-08
Payer: COMMERCIAL

## 2021-01-08 ENCOUNTER — APPOINTMENT (OUTPATIENT)
Dept: NUCLEAR MEDICINE | Age: 56
DRG: 605 | End: 2021-01-08
Payer: COMMERCIAL

## 2021-01-08 LAB
ALBUMIN SERPL-MCNC: 3.6 G/DL (ref 3.5–5.2)
ALP BLD-CCNC: 49 U/L (ref 40–129)
ALT SERPL-CCNC: 18 U/L (ref 0–40)
AMPHETAMINE SCREEN, URINE: NOT DETECTED
ANION GAP SERPL CALCULATED.3IONS-SCNC: 9 MMOL/L (ref 7–16)
AST SERPL-CCNC: 21 U/L (ref 0–39)
BARBITURATE SCREEN URINE: NOT DETECTED
BENZODIAZEPINE SCREEN, URINE: NOT DETECTED
BILIRUB SERPL-MCNC: 0.8 MG/DL (ref 0–1.2)
BILIRUBIN URINE: NEGATIVE
BLOOD, URINE: NEGATIVE
BUN BLDV-MCNC: 13 MG/DL (ref 6–20)
CALCIUM SERPL-MCNC: 8.7 MG/DL (ref 8.6–10.2)
CANNABINOID SCREEN URINE: NOT DETECTED
CHLORIDE BLD-SCNC: 105 MMOL/L (ref 98–107)
CHOLESTEROL, TOTAL: 129 MG/DL (ref 0–199)
CLARITY: CLEAR
CO2: 25 MMOL/L (ref 22–29)
COCAINE METABOLITE SCREEN URINE: NOT DETECTED
COLOR: YELLOW
CREAT SERPL-MCNC: 1.3 MG/DL (ref 0.7–1.2)
FENTANYL SCREEN, URINE: NOT DETECTED
GFR AFRICAN AMERICAN: >60
GFR NON-AFRICAN AMERICAN: 57 ML/MIN/1.73
GLUCOSE BLD-MCNC: 98 MG/DL (ref 74–99)
GLUCOSE URINE: NEGATIVE MG/DL
HCT VFR BLD CALC: 42.1 % (ref 37–54)
HDLC SERPL-MCNC: 41 MG/DL
HEMOGLOBIN: 15.1 G/DL (ref 12.5–16.5)
KETONES, URINE: NEGATIVE MG/DL
LDL CHOLESTEROL CALCULATED: 56 MG/DL (ref 0–99)
LEUKOCYTE ESTERASE, URINE: NEGATIVE
LV EF: 58 %
LV EF: 61 %
LVEF MODALITY: NORMAL
LVEF MODALITY: NORMAL
Lab: NORMAL
MCH RBC QN AUTO: 34.6 PG (ref 26–35)
MCHC RBC AUTO-ENTMCNC: 35.9 % (ref 32–34.5)
MCV RBC AUTO: 96.6 FL (ref 80–99.9)
METHADONE SCREEN, URINE: NOT DETECTED
NITRITE, URINE: NEGATIVE
OPIATE SCREEN URINE: NOT DETECTED
OXYCODONE URINE: NOT DETECTED
PDW BLD-RTO: 11.9 FL (ref 11.5–15)
PH UA: 5.5 (ref 5–9)
PHENCYCLIDINE SCREEN URINE: NOT DETECTED
PLATELET # BLD: 175 E9/L (ref 130–450)
PMV BLD AUTO: 11.7 FL (ref 7–12)
POTASSIUM REFLEX MAGNESIUM: 3.9 MMOL/L (ref 3.5–5)
PROTEIN UA: NEGATIVE MG/DL
RBC # BLD: 4.36 E12/L (ref 3.8–5.8)
SODIUM BLD-SCNC: 139 MMOL/L (ref 132–146)
SPECIFIC GRAVITY UA: <=1.005 (ref 1–1.03)
TOTAL PROTEIN: 5.3 G/DL (ref 6.4–8.3)
TRIGL SERPL-MCNC: 160 MG/DL (ref 0–149)
UROBILINOGEN, URINE: 0.2 E.U./DL
VLDLC SERPL CALC-MCNC: 32 MG/DL
WBC # BLD: 7.8 E9/L (ref 4.5–11.5)

## 2021-01-08 PROCEDURE — 6360000002 HC RX W HCPCS: Performed by: FAMILY MEDICINE

## 2021-01-08 PROCEDURE — 80061 LIPID PANEL: CPT

## 2021-01-08 PROCEDURE — 36415 COLL VENOUS BLD VENIPUNCTURE: CPT

## 2021-01-08 PROCEDURE — 2060000000 HC ICU INTERMEDIATE R&B

## 2021-01-08 PROCEDURE — 93018 CV STRESS TEST I&R ONLY: CPT | Performed by: INTERNAL MEDICINE

## 2021-01-08 PROCEDURE — G0378 HOSPITAL OBSERVATION PER HR: HCPCS

## 2021-01-08 PROCEDURE — 99223 1ST HOSP IP/OBS HIGH 75: CPT | Performed by: PSYCHIATRY & NEUROLOGY

## 2021-01-08 PROCEDURE — 85027 COMPLETE CBC AUTOMATED: CPT

## 2021-01-08 PROCEDURE — 93016 CV STRESS TEST SUPVJ ONLY: CPT | Performed by: INTERNAL MEDICINE

## 2021-01-08 PROCEDURE — A9500 TC99M SESTAMIBI: HCPCS | Performed by: RADIOLOGY

## 2021-01-08 PROCEDURE — 81003 URINALYSIS AUTO W/O SCOPE: CPT

## 2021-01-08 PROCEDURE — 78452 HT MUSCLE IMAGE SPECT MULT: CPT

## 2021-01-08 PROCEDURE — 93306 TTE W/DOPPLER COMPLETE: CPT

## 2021-01-08 PROCEDURE — 93880 EXTRACRANIAL BILAT STUDY: CPT

## 2021-01-08 PROCEDURE — 93017 CV STRESS TEST TRACING ONLY: CPT

## 2021-01-08 PROCEDURE — 2580000003 HC RX 258: Performed by: FAMILY MEDICINE

## 2021-01-08 PROCEDURE — 80053 COMPREHEN METABOLIC PANEL: CPT

## 2021-01-08 PROCEDURE — 80307 DRUG TEST PRSMV CHEM ANLYZR: CPT

## 2021-01-08 PROCEDURE — 99232 SBSQ HOSP IP/OBS MODERATE 35: CPT | Performed by: INTERNAL MEDICINE

## 2021-01-08 PROCEDURE — 93880 EXTRACRANIAL BILAT STUDY: CPT | Performed by: RADIOLOGY

## 2021-01-08 PROCEDURE — 3430000000 HC RX DIAGNOSTIC RADIOPHARMACEUTICAL: Performed by: RADIOLOGY

## 2021-01-08 RX ADMIN — Medication 12 MILLICURIE: at 08:05

## 2021-01-08 RX ADMIN — SODIUM CHLORIDE, PRESERVATIVE FREE 10 ML: 5 INJECTION INTRAVENOUS at 11:54

## 2021-01-08 RX ADMIN — Medication 35 MILLICURIE: at 10:34

## 2021-01-08 ASSESSMENT — PAIN SCALES - GENERAL
PAINLEVEL_OUTOF10: 0

## 2021-01-08 NOTE — PROCEDURES
Exercise Nuclear Stress Test    Date: 1/8/2021    Indication: Syncope    Description of procedure:    Protocol: Exercise stress SPECT myocardial perfusion imaging, [facility] protocol    Baseline EKG: SB 55 bpm, Right axis. iRBBB 100 ms. Baseline BP: 108/64 mmHg    Patient exercised for a total of 11 minutes, achieving a peak heart rate of 150 bpm which is 90% of the predicted maximum. Peak BP was 150/76 mmHg. Total workload achieved was 13.3 METs. Patient reported no chest pain, no dizziness or lightheadedness. Stress EKG showed no evidence of ischemia, and no arrhythmias were noted. Radiotracer was injected at peak exercise. Impression:  1. No evidence of ischemia on exercise stress EKG. 2. Normal blood pressure response to exercise. 3. Normal heart rate recovery. 4. Above average functional capacity. 5. No syncope/presyncope. 6. Nuclear images to be reported separately.     Yasmany Dickey MD, [address] [facility]

## 2021-01-08 NOTE — PROGRESS NOTES
Hospitalist Progress Note      SYNOPSIS: Patient admitted on 2021 for recurrent episodes of syncope and collapse. SUBJECTIVE:    Patient seen and examined  Records reviewed. Endorses no new complaints  Seen shortly after stress test    Stable overnight. No other overnight issues reported. Temp (24hrs), Av.6 °F (36.4 °C), Min:97.5 °F (36.4 °C), Max:97.7 °F (36.5 °C)    DIET: Diet NPO, After Midnight Exceptions are: Sips with Meds  CODE: Full Code    Intake/Output Summary (Last 24 hours) at 2021 1004  Last data filed at 2021 1257  Gross per 24 hour   Intake 1000 ml   Output --   Net 1000 ml       OBJECTIVE:    /71   Pulse 50   Temp 97.5 °F (36.4 °C) (Temporal)   Resp 18   Ht 5' 11\" (1.803 m)   Wt 150 lb (68 kg)   SpO2 96%   BMI 20.92 kg/m²     General appearance: No apparent distress, appears stated age and cooperative. HEENT:  Conjunctivae/corneas clear. Neck: Supple. No jugular venous distention. Respiratory: Clear to auscultation bilaterally, normal respiratory effort  Cardiovascular: Regular rate rhythm, normal S1-S2  Abdomen: Soft, nontender, nondistended  Musculoskeletal: No clubbing, cyanosis, no bilateral lower extremity edema. Brisk capillary refill. Skin:  No rashes  on visible skin  Neurologic: awake, alert and following commands     ASSESSMENT:       Recurrent episodes of syncope  History of migraines  Tobacco abuse disorder  Family history of premature CAD  Incomplete right bundle victor m block      PLAN:    Cardiology plans noted for stress echocardiogram  Continue Lovenox  Increase noted in serum creatinine. Gentle IV fluid hydration and encourage p.o. intake  LDL 56, TSH 1.08  CT head and CT C-spine reviewed  Discussed with cardiology.     DISPOSITION: Pending further course    Medications:  REVIEWED DAILY    Infusion Medications   Scheduled Medications    lidocaine-EPINEPHrine  20 mL Intradermal Once    sodium chloride flush  10 mL Intravenous 2 times per day    enoxaparin  40 mg Subcutaneous Daily     PRN Meds: technetium sestamibi, sodium chloride flush, promethazine **OR** ondansetron, polyethylene glycol, acetaminophen **OR** acetaminophen, perflutren lipid microspheres    Labs:     Recent Labs     01/07/21  1036 01/08/21  0432   WBC 6.8 7.8   HGB 17.4* 15.1   HCT 48.5 42.1    175       Recent Labs     01/07/21  1037 01/08/21  0432    139   K 3.6 3.9    105   CO2 20* 25   BUN 9 13   CREATININE 1.1 1.3*   CALCIUM 9.1 8.7       Recent Labs     01/07/21  1037 01/08/21  0432   PROT 6.5 5.3*   ALKPHOS 56 49   ALT 21 18   AST 21 21   BILITOT 1.0 0.8       No results for input(s): INR in the last 72 hours. Recent Labs     01/07/21  1037 01/07/21  1516   TROPONINI <0.01 <0.01       Chronic labs:    Lab Results   Component Value Date    CHOL 129 01/08/2021    TRIG 160 (H) 01/08/2021    HDL 41 01/08/2021    LDLCALC 56 01/08/2021    TSH 1.080 01/07/2021       Radiology: REVIEWED DAILY    +++++++++++++++++++++++++++++++++++++++++++++++++  [facility]  +++++++++++++++++++++++++++++++++++++++++++++++++  NOTE: This report was transcribed using voice recognition software. Every effort was made to ensure accuracy; however, inadvertent computerized transcription errors may be present.

## 2021-01-08 NOTE — CONSULTS
NEUROLOGY CONSULT NOTE      Requesting Physician: Cecy Lorenzana MD    Reason for Consult:  Evaluate for eval for seizure? presentation not congruent with cardiac syncope. History of Present Illness:  Missy Verdugo is a 54 y.o. male  with no significant past medical history who was admitted to [facility]  on 1/7/2021 with presentation of syncope. Patient reports that he was at work when he noticed acute onset of trouble focusing with dizziness. He then suddenly lost consciousness and fell backwards striking the back of his head with loss of consciousness. Patient with laceration of the top of his head requiring sutures which he believes he struck against side of his truck as he fell. There were no witnesses to the event but he was found soon after by a coworker who was passing by when this occurred. His next recall is of being in the ambulance and having difficulty answering questions with confusion. Patient indicated that he had 2 increased dizziness with unsteadiness and near syncope in the last 6 months. He does have a history syncope on standing when he was in his 25s occurring when he stood up too quickly. However, this event was different. No report of any specific precipitating event or factors. He has been no recent illness, infections, trauma, or medication changes. Patient is previously but otherwise healthy. He denied any headache, weakness, numbness/tingling, chest pain, palpitations, shortness of breath, cough, diaphoresis, abdominal pain, or alteration in cognition/sensorium prior to the event. There is no tongue biting, bowel incontinence, or urinary incontinence associated with the event. No significant injury post event. Confusion post event was short lasting only after during time of ambulance transport to the ED. There is no laboratory abnormalities noted on ED evaluation. CT scan of the head was unremarkable.        Past Medical History:    No past medical history on file.    No past surgical history on file. Social History:  Social History     Tobacco Use   Smoking Status Former Smoker   Smokeless Tobacco Current User    Types: Snuff     Social History     Substance and Sexual Activity   Alcohol Use Not Currently     Social History     Substance and Sexual Activity   Drug Use Never         Family History:   No family history on file. Review of Systems:  All systems reviewed are negative except what is mentioned in history of present illness. Allergies:    No Known Allergies     Current Medications:       lidocaine-EPINEPHrine 1 percent-1:[id number] injection 20 mL, Once      sodium chloride flush 0.9 % injection 10 mL, 2 times per day      sodium chloride flush 0.9 % injection 10 mL, PRN      enoxaparin (LOVENOX) injection 40 mg, Daily      promethazine (PHENERGAN) tablet 12.5 mg, Q6H PRN    Or      ondansetron (ZOFRAN) injection 4 mg, Q6H PRN      polyethylene glycol (GLYCOLAX) packet 17 g, Daily PRN      acetaminophen (TYLENOL) tablet 650 mg, Q6H PRN    Or      acetaminophen (TYLENOL) suppository 650 mg, Q6H PRN      perflutren lipid microspheres (DEFINITY) injection 1.65 mg, ONCE PRN         Physical Exam:  /79   Pulse 59   Temp 98.2 °F (36.8 °C) (Temporal)   Resp 18   Ht 5' 11\" (1.803 m)   Wt 150 lb (68 kg)   SpO2 98%   BMI 20.92 kg/m²  I Body mass index is 20.92 kg/m². I   Wt Readings from Last 1 Encounters:   01/07/21 150 lb (68 kg)          HEENT: Normocephalic, atraumatic, no lesions or abnormalities noted. Neck:  supple with full ROM; no masses, nodes or bruits; no cervical tenderness on palpation. Lungs:  clear to auscultation  bilaterally     CV: RRR without gallops or murmurs     Extremities: no c/c/e            Neurologic Exam   Mental Status:  Patient was alert, responsive, oriented, appropriate, answering questions, and following commands. Speech was fluent with normal sensorium and cognition.    Cranial Nerves: Pupils were equal round and reactive to light and accommodation;    Visual fields were full on confrontation; Extraocular movements were intact; no nystagmus; Intact facial sensation to temp, pinprick, and light touch; Symmetric facial movements with good lip and eye closure bilaterally; Hearing was intact; Normal palatal elevation with midline uvula  Trapezius strength of 5/5. Tongue was midline with no atrophy or fasciculations  Motor Exam:  Strength was 5/5 throughout; normal tone and bulk; no atrophy or fasiculations noted. Sensory Exam:  Normal sensation to light touch, pin-prick, and temperature; No sensory extinction. Cerebella Exam:  Intact finger-nose-finger, rapidly alternating movements, fine motor movements, and heel-down-shin maneuvers; No cerebellar rebound or drift; No tremors; Normal tandem gait. Negative Romberg   Gait:  Normal gait pattern with intact heel/toe walking. Reflexes: normal and symmetric bilaterally; Babinski is negative    Labs:    CBC:   Recent Labs     01/07/21  1036 01/08/21  0432   WBC 6.8 7.8   HGB 17.4* 15.1    175   MCV 95.1 96.6   MCH 34.1 34.6   MCHC 35.9* 35.9*   RDW 11.7 11.9     CMP:  Recent Labs     01/07/21  1037 01/07/21  1043 01/08/21  0432     --  139   K 3.6  --  3.9     --  105   CO2 20*  --  25   BUN 9  --  13   CREATININE 1.1  --  1.3*   GFRAA >60  --  >60   LABGLOM >60  --  57   GLUCOSE 134* 123 98   CALCIUM 9.1  --  8.7     Liver:   Recent Labs     01/08/21  0432   AST 21   ALT 18   ALKPHOS 49   PROT 5.3*   LABALBU 3.6   BILITOT 0.8     INR: No results for input(s): PROTIME, INR in the last 72 hours. ToxicologyNo results for input(s): PHENYTOIN, CARBTOT, PHENOBARB, VALPROATE in the last 72 hours. Invalid input(s): LAMOTRIG,  KEPPRA  No results for input(s): AMPMETHURSCR, BARBTQTU, BDZQTU, CANNABQUANT, COCMETQTU, OPIAU, PCPQUANT in the last 72 hours.     Radiology:  Ct Head Wo Contrast    Result Date: 1/7/2021  EXAMINATION: CT OF THE HEAD WITHOUT CONTRAST  1/7/2021 11:52 am TECHNIQUE: CT of the head was performed without the administration of intravenous contrast. Dose modulation, iterative reconstruction, and/or weight based adjustment of the mA/kV was utilized to reduce the radiation dose to as low as reasonably achievable. COMPARISON: None. HISTORY: ORDERING SYSTEM PROVIDED HISTORY: fall TECHNOLOGIST PROVIDED HISTORY: Reason for exam:->fall Has a \"code stroke\" or \"stroke alert\" been called? ->No What reading provider will be dictating this exam?->CRC FINDINGS: BRAIN/VENTRICLES: There is no acute intracranial hemorrhage, mass effect or midline shift. No abnormal extra-axial fluid collection. The gray-white differentiation is maintained without evidence of an acute infarct. There is no evidence of hydrocephalus. Visualize calcifications are seen in the choroid plexus in both the lateral ventricles, 4th ventricular and also in the pineal gland. There is a small hi of calcification in the proximal left A2 segment of the left anterior cerebral artery, relate with there atherosclerotic changes more likely. ORBITS: The visualized portion of the orbits demonstrate no acute abnormality. SINUSES: The visualized paranasal sinuses and mastoid air cells demonstrate no acute abnormality. SOFT TISSUES/SKULL:  No acute abnormality of the visualized skull or soft tissues. No acute intracranial abnormality. Ct Cervical Spine Wo Contrast    Result Date: 1/7/2021  EXAMINATION: CT OF THE CERVICAL SPINE WITHOUT CONTRAST 1/7/2021 11:52 am TECHNIQUE: CT of the cervical spine was performed without the administration of intravenous contrast. Multiplanar reformatted images are provided for review. Dose modulation, iterative reconstruction, and/or weight based adjustment of the mA/kV was utilized to reduce the radiation dose to as low as reasonably achievable. COMPARISON: None.  HISTORY: ORDERING SYSTEM PROVIDED HISTORY: fall well as extracardiac GI uptake adjacent to the inferolateral wall. The heart appears normal in size on both rest and stress images. Post stress tomographic images demonstrate a moderate-sized area of mildly decreased uptake in the inferior wall extending to the inferoapex. Resting images show no significant reversibility. Inferior wall motion is normal, and thus the perfusion defect is likely due to diaphragmatic attenuation artifact with some apical thinning. Gated images demonstrate normal wall motion and thickening, with a calculated left ventricular ejection fraction of 61%. End-diastolic volume 346 mL. TID ratio 0.98.    1. Normal myocardial perfusion with diaphragmatic attenuation artifact. 2. No definitive evidence of stress-induced ischemia or prior myocardial infarction. 3. Normal LV function, EF 61%. 4. There is no transient ischemic dilation. 5. Low risk myocardial perfusion study. The patient's records from referring provider and available information in the EHR was reviewed. Impression:  1. Syncope and collapse  2. Transient alteration in mental status  3. Traumatic head injury with suspicion of concussion    Patient presenting with history of dizziness and prior orthostatic dizziness now with dizziness preceding loss of consciousness. It is possible that he became unsteady with his dizziness and he fell backwards hitting his head against his truck causing his loss of consciousness. This will induced a concussion with associated memory loss surrounding the event. It is also possible that this is seizure activity, but clinical history is less supportive of seizure activity. He is being followed by cardiology with ongoing work-up. Will defer to cardiology work-up at this time with recommendation for neurology follow-up if similar episodes of random loss of consciousness with out head injury and resultant confusion.     Active Problems:    Syncope and collapse    Head injury  Resolved Problems:    * No resolved hospital problems. *      Recommendations:                                            1. Continue with cardiology work-up for possible syncope. 2. Neurology outpatient follow-up with probable MRI and EEG if further events and cardiogenic syncope ruled out. 3. Please reconsult if further need. It was my pleasure to evaluate Rena Rosario today. Please call with questions.       Electronically signed by Ga Sanchez MD on 1/8/2021 at 4:59 PM

## 2021-01-08 NOTE — PROGRESS NOTES
INPATIENT CARDIOLOGY FOLLOW-UP    Name: Moriah Ruiz    Age: 54 y.o. Date of Admission: 1/7/2021 10:26 AM    Date of Service: 1/8/2021    Chief Complaint: Follow-up for syncope     Interim History:  No new overnight cardiac complaints. Currently with no complaints of CP, SOB, palpitations, dizziness, or lightheadedness. SR on telemetry.     Review of Systems:   Cardiac: As per HPI  General: No fever, chills  Pulmonary: As per HPI  HEENT: No visual disturbances, difficult swallowing  GI: No nausea, vomiting  Endocrine: No thyroid disease or DM  Musculoskeletal: PALOMARES x 4, no focal motor deficits  Skin: Intact, no rashes  Neuro/Psych: No headache or seizures    Problem List:  Patient Active Problem List   Diagnosis    Syncope and collapse    Head injury       Allergies:  No Known Allergies    Current Medications:  Current Facility-Administered Medications   Medication Dose Route Frequency Provider Last Rate Last Admin    lidocaine-EPINEPHrine 1 percent-1:871000 injection 20 mL  20 mL Intradermal Once Jaci Pierce MD        sodium chloride flush 0.9 % injection 10 mL  10 mL Intravenous 2 times per day Ascencion Pappas, DO   10 mL at 01/08/21 1154    sodium chloride flush 0.9 % injection 10 mL  10 mL Intravenous PRN Art Mian,         enoxaparin (LOVENOX) injection 40 mg  40 mg Subcutaneous Daily Art Mian,         promethazine (PHENERGAN) tablet 12.5 mg  12.5 mg Oral Q6H PRN Ascencion Pappas DO        Or    [facility] injection 4 mg  4 mg Intravenous Q6H PRN Art Mian,         polyethylene glycol (GLYCOLAX) packet 17 g  17 g Oral Daily PRN Ascencion Pappas, DO        acetaminophen (TYLENOL) tablet 650 mg  650 mg Oral Q6H PRN Art Mian, DO   650 mg at 01/07/21 1825    Or    acetaminophen (TYLENOL) suppository 650 mg  650 mg Rectal Q6H PRN Art Mian, DO        perflutren lipid microspheres (DEFINITY) injection 1.65 mg  1.5 mL Intravenous ONCE PRN Art Mian, DO Physical Exam:  /79   Pulse 59   Temp 98.2 °F (36.8 °C) (Temporal)   Resp 18   Ht 5' 11\" (1.803 m)   Wt 150 lb (68 kg)   SpO2 98%   BMI 20.92 kg/m²   Wt Readings from Last 3 Encounters:   01/07/21 150 lb (68 kg)     Appearance: Awake, alert, no acute respiratory distress  Skin: Intact, no rash  Head: Normocephalic, atraumatic  Eyes: EOMI, no conjunctival erythema  ENMT: No pharyngeal erythema, MMM, no rhinorrhea  Neck: Supple, no elevated JVP, no carotid bruits  Lungs: Clear to auscultation bilaterally. No wheezes, rales, or rhonchi. Cardiac: Regular rate and rhythm, +S1S2, no murmurs apparent  Abdomen: Soft, nontender, +bowel sounds  Extremities: Moves all extremities x 4, no lower extremity edema  Neurologic: No focal motor deficits apparent, normal mood and affect  Peripheral Pulses: Intact posterior tibial pulses bilaterally    Intake/Output:  No intake or output data in the 24 hours ending 01/08/21 1755  No intake/output data recorded.     Laboratory Tests:  Recent Labs     01/07/21  1037 01/07/21  1043 01/08/21  0432     --  139   K 3.6  --  3.9     --  105   CO2 20*  --  25   BUN 9  --  13   CREATININE 1.1  --  1.3*   GLUCOSE 134* 123 98   CALCIUM 9.1  --  8.7     Lab Results   Component Value Date    MG 2.2 01/07/2021     Recent Labs     01/07/21  1037 01/08/21  0432   ALKPHOS 56 49   ALT 21 18   AST 21 21   PROT 6.5 5.3*   BILITOT 1.0 0.8   LABALBU 4.4 3.6     Recent Labs     01/07/21  1036 01/08/21  0432   WBC 6.8 7.8   RBC 5.10 4.36   HGB 17.4* 15.1   HCT 48.5 42.1   MCV 95.1 96.6   MCH 34.1 34.6   MCHC 35.9* 35.9*   RDW 11.7 11.9    175   MPV 10.6 11.7     Lab Results   Component Value Date    TROPONINI <0.01 01/07/2021    TROPONINI <0.01 01/07/2021     No results found for: INR, PROTIME  Lab Results   Component Value Date    TSH 1.080 01/07/2021     No results found for: LABA1C  No results found for: EAG  Lab Results   Component Value Date    CHOL 129 01/08/2021 Lab Results   Component Value Date    TRIG 160 (H) 01/08/2021     Lab Results   Component Value Date    HDL 41 01/08/2021     Lab Results   Component Value Date    LDLCALC 56 01/08/2021     Lab Results   Component Value Date    LABVLDL 32 01/08/2021     No results found for: [facility]    Cardiac Tests:    Telemetry findings reviewed: Sinus bradycardia with heart rate in the 50s, no new tachy/bradyarrhythmias overnight     EKG: Sinus rhythm, incomplete right bundle branch block, left atrial enlargement, abnormal EKG.    Vitals and labs were reviewed: Blood pressure 112/79 with heart rate of 59. Orthostatic blood pressures were negative.     Labs-BMP normal creatinine 1.3, proBNP 7 7 troponin I 0.01x2, liver function normal CBC was normal.  Cholesterol 129, HDL 41, LDL 56 triglycerides 160 liver function normal urine drug screen normal, CBC normal.  CT head showed no acute abnormality. Exercise nuclear stress test-1/8/2021: Duke treadmill score 11, above average functional capacity. No stress-induced ischemia or prior myocardial infarction. LVEF 61%. No 3 times daily. Low risk myocardial perfusion scan. No arrhythmias with exercise. TTE-1/8/2021:  Normal left ventricle size and systolic function. Ejection fraction is visually estimated at 55-60%. Normal left ventricular ejection fraction. No regional wall motion abnormalities seen. Normal left ventricle wall thickness. Normal diastolic function. Mildly dilated right ventricle. Right ventricle global systolic function is normal . TAPSE 28 mm. No hemodynamically significant aortic stenosis is present. Physiologic and/or trace tricuspid regurgitation. RVSP is 20 mmHg. Normal estimated PA systolic pressure. No evidence for hemodynamically significant pericardial effusion. No previous echo for comparison.     Assessment:  · Recurrent episodes of near syncope now presents with syncope and head laceration.   Etiology unclear EKG showed no evidence of QT prolongation or evidence of Brugada syndrome. There is questionable history of sudden cardiac death in his family. On examination he does not have any abnormal heart sounds. His symptoms are mainly with exertion. Cardiac work up is negative. · History of migraine headaches  · Tobacco use disorder  · Family history of premature CAD  · Incomplete right bundle branch block           Plan:  · Echo and stress test results discussed with the patient and they are normal   · Carotid Dopplers mild disease  ·  Will arrange for a 30-day Holter monitor. · Patient was recommended no driving for the next 6 months. · He was also advised to quit tobacco use. · Neuro evaluation prior to discharge  · Follow up with me in one month. He stable from the cardiology standpoint for discharge. Ana Baxter MD., Carlos Montalvo.   [facility]) Cardiology

## 2021-01-09 VITALS
OXYGEN SATURATION: 99 % | HEIGHT: 71 IN | SYSTOLIC BLOOD PRESSURE: 111 MMHG | RESPIRATION RATE: 16 BRPM | WEIGHT: 150 LBS | TEMPERATURE: 97.8 F | HEART RATE: 55 BPM | DIASTOLIC BLOOD PRESSURE: 88 MMHG | BODY MASS INDEX: 21 KG/M2

## 2021-01-09 LAB
ALBUMIN SERPL-MCNC: 4 G/DL (ref 3.5–5.2)
ALP BLD-CCNC: 46 U/L (ref 40–129)
ALT SERPL-CCNC: 14 U/L (ref 0–40)
ANION GAP SERPL CALCULATED.3IONS-SCNC: 7 MMOL/L (ref 7–16)
AST SERPL-CCNC: 19 U/L (ref 0–39)
BILIRUB SERPL-MCNC: 0.6 MG/DL (ref 0–1.2)
BUN BLDV-MCNC: 15 MG/DL (ref 6–20)
CALCIUM SERPL-MCNC: 9 MG/DL (ref 8.6–10.2)
CHLORIDE BLD-SCNC: 106 MMOL/L (ref 98–107)
CO2: 28 MMOL/L (ref 22–29)
CREAT SERPL-MCNC: 1 MG/DL (ref 0.7–1.2)
GFR AFRICAN AMERICAN: >60
GFR NON-AFRICAN AMERICAN: >60 ML/MIN/1.73
GLUCOSE BLD-MCNC: 104 MG/DL (ref 74–99)
HCT VFR BLD CALC: 43.1 % (ref 37–54)
HEMOGLOBIN: 15.4 G/DL (ref 12.5–16.5)
MCH RBC QN AUTO: 34.1 PG (ref 26–35)
MCHC RBC AUTO-ENTMCNC: 35.7 % (ref 32–34.5)
MCV RBC AUTO: 95.6 FL (ref 80–99.9)
PDW BLD-RTO: 11.9 FL (ref 11.5–15)
PLATELET # BLD: 168 E9/L (ref 130–450)
PMV BLD AUTO: 11.3 FL (ref 7–12)
POTASSIUM REFLEX MAGNESIUM: 4 MMOL/L (ref 3.5–5)
RBC # BLD: 4.51 E12/L (ref 3.8–5.8)
SODIUM BLD-SCNC: 141 MMOL/L (ref 132–146)
TOTAL PROTEIN: 5.8 G/DL (ref 6.4–8.3)
WBC # BLD: 5.6 E9/L (ref 4.5–11.5)

## 2021-01-09 PROCEDURE — G0378 HOSPITAL OBSERVATION PER HR: HCPCS

## 2021-01-09 PROCEDURE — 80053 COMPREHEN METABOLIC PANEL: CPT

## 2021-01-09 PROCEDURE — 36415 COLL VENOUS BLD VENIPUNCTURE: CPT

## 2021-01-09 PROCEDURE — 85027 COMPLETE CBC AUTOMATED: CPT

## 2021-01-09 ASSESSMENT — PAIN SCALES - GENERAL: PAINLEVEL_OUTOF10: 0

## 2021-01-09 NOTE — PLAN OF CARE
Problem: Falls - Risk of:  Goal: Will remain free from falls  Description: Will remain free from falls  1/9/2021 1101 by Kelvin Beatty RN  Outcome: Completed  1/9/2021 0009 by Micky Booker RN  Outcome: Met This Shift  Goal: Absence of physical injury  Description: Absence of physical injury  1/9/2021 1101 by Kelvin Beatty RN  Outcome: Completed  1/9/2021 0009 by Micky Booker RN  Outcome: Met This Shift

## 2021-01-09 NOTE — PROGRESS NOTES
Explained plan of care to patient, patient is agreeable to staying over night at this time. Will await the time of labs, and see how his BUN and creatinine come back. depending on whether or not they come back high, he may have to start fluids according to Dr. Layla Alas. Educated patient, and he requesting that in the case he does require fluids, he would prefer that he start the fluids in the morning.  Will monitor patient

## 2021-01-09 NOTE — DISCHARGE SUMMARY
Hospitalist Discharge Summary    Patient ID: Priyanka Gilmore   Patient : 1965  Patient's PCP: No primary care provider on file. Admit Date: 2021   Admitting Physician: Judie Malave DO    Discharge Date:  2021   Discharge Physician: Rosaura Anderson MD   Discharge Condition: Stable  Discharge Disposition: [facility] course in brief:  (Please refer to daily progress notes for a comprehensive review of the hospitalization by requesting medical records)    HPI  54y.o. year old male  who  has no past medical history on file.      Patient presents emergency department after passing out. Per witnesses the patient's body stiffened like a board and he fell backwards and hit his head. Patient reports that just prior to the episode he was having trouble focusing, as if he were opening closing his eyes very quickly. He was also feeling very dizzy. The next thing he remembers he was in the ambulance and initially had trouble answering questions such as what year it was. At the outset of this episode he denied having any chest pain or shortness of breath. Patient reports this is happened at least twice in the last 6 months. Noise at work and was after he had a couple coffee. Reports that when he was younger in his 25s if he stood up too fast he would pass out and find himself on the floor. Patient drives semi-gasoline truck for living.     On arrival to the emergency department patient's vital signs within normal and stable. Patient was alert. Physical exam unremarkable. He does have a 4 cm laceration to his scalp. This was repaired in the emergency department. Laboratory studies unremarkable. EKG unremarkable. CT of the head unremarkable. Patient is a normal stress test and echocardiogram.  Ultrasound was with mild disease. Neurology consultation was also sought.   Recommended MRI EEG as outpatient    Consults:   IP CONSULT TO HOSPITALIST  IP CONSULT TO CARDIOLOGY  IP CONSULT TO NEUROLOGY    Discharge Diagnoses:    Syncope  History of migraines  Tobacco abuse disorder  Family history of premature CAD  Incomplete right bundle victor m block    Discharge Instructions / Follow up:    No driving until cleared by physician    Continued appropriate risk factor modification of blood pressure, diabetes and serum lipids will remain essential to reducing risk of future atherosclerotic development    Activity: activity as tolerated    Significant labs:  CBC:   Recent Labs     01/07/21  1036 01/08/21  0432 01/09/21  0636   WBC 6.8 7.8 5.6   RBC 5.10 4.36 4.51   HGB 17.4* 15.1 15.4   HCT 48.5 42.1 43.1   MCV 95.1 96.6 95.6   RDW 11.7 11.9 11.9    175 168     BMP:   Recent Labs     01/07/21  1037 01/07/21  1516 01/08/21  0432 01/09/21  0636     --  139 141   K 3.6  --  3.9 4.0     --  105 106   CO2 20*  --  25 28   BUN 9  --  13 15   CREATININE 1.1  --  1.3* 1.0   MG  --  2.2  --   --      LFT:  Recent Labs     01/07/21  1037 01/08/21  0432 01/09/21  0636   PROT 6.5 5.3* 5.8*   ALKPHOS 56 49 46   ALT 21 18 14   AST 21 21 19   BILITOT 1.0 0.8 0.6     PT/INR: No results for input(s): INR, APTT in the last 72 hours. BNP: No results for input(s): BNP in the last 72 hours.   Hgb A1C: No results found for: LABA1C  Folate and B12: No results found for: GLBNGHWK41, No results found for: FOLATE  Thyroid Studies:   Lab Results   Component Value Date    TSH 1.080 01/07/2021       Urinalysis:    Lab Results   Component Value Date    NITRU Negative 01/08/2021    BLOODU Negative 01/08/2021    SPECGRAV <=1.005 01/08/2021    GLUCOSEU Negative 01/08/2021       Imaging:  Echo Complete    Result Date: 1/8/2021  Transthoracic Echocardiography Report (TTE)  Demographics   Patient Name    Tita Berg     Gender            Male                  [address]  [id number]     Room Number       8501  Number   Account #       [de-identified]    Procedure Date    01/08/2021   Corporate ID                 Ordering Physician   Accession       [id number]   Referring  Number                       Physician   Date of Birth   1965   Sonographer       Carolyn Velez [facility]   Age             54 year(s)   Interpreting      [address]                               Physician         Physician Cardiology                                                 Gege Styles MD                                Any Other  Procedure Type of Study   TTE procedure:Echo Complete W/Doppler & Color Flow. Procedure Date Date: 01/08/2021 Start: 12:28 PM Study Location: Portable Technical Quality: Adequate visualization Indications:Syncope. Patient Status: Routine Height: 71 inches Weight: 150 pounds BSA: 1.87 m^2 BMI: 20.92 kg/m^2 HR: 47 bpm BP: 109/64 mmHg  Findings   Left Ventricle  Normal left ventricle size and systolic function. Ejection fraction is visually estimated at 55-60%. Normal left ventricular ejection fraction. No regional wall motion abnormalities seen. Normal left ventricle wall thickness. Normal diastolic function. Right Ventricle  Mildly dilated right ventricle. Right ventricle global systolic function is normal . TAPSE 28 mm. Left Atrium  Left atrium is of normal size. Right Atrium  Normal right atrium size. Mitral Valve  Normal mitral valve structure and function. Physiologic and/or trace mitral regurgitation is present. No evidence of mitral valve stenosis. Tricuspid Valve  The tricuspid valve appears structurally normal.  Physiologic and/or trace tricuspid regurgitation. RVSP is 20 mmHg. Normal estimated PA systolic pressure. Aortic Valve  Structurally normal aortic valve. The aortic valve is trileaflet with good leaflet separation. No hemodynamically significant aortic stenosis is present. No evidence of aortic valve regurgitation. Pulmonic Valve  Pulmonic valve is structurally normal.  Physiologic and/or trace pulmonic regurgitation present.   No evidence of pulmonic RA Area: 14.2 cm^2  LV Mass: 156.93  g  Doppler Measurements & Calculations   MV Peak E-Wave: 0.74 AV Peak Velocity:     LVOT Peak Velocity: 1.07 m/s  m/s                  1.37 m/s              LVOT Mean Velocity: 0.7 m/s  MV Peak A-Wave: 0.51 AV Peak Gradient:     LVOT Peak Gradient: 4.6  m/s                  7.53 mmHg             mmHgLVOT Mean Gradient: 2.3  MV E/A Ratio: 1.46   AV Mean Velocity:     mmHg  MV Peak Gradient:    0.97 m/s              Estimated RVSP: 20.3 mmHg  1.7 mmHg             AV Mean Gradient: 4.2 Estimated RAP:3 mmHg  MV Mean Gradient:    mmHg  0.5 mmHg             AV VTI: 27.9 cm  MV Mean Velocity:    AV Area               TR Velocity:2.08 m/s  0.32 m/s             (Continuity):2.89     TR Gradient:17.32 mmHg  MV Deceleration      cm^2                  PV Peak Velocity: 0.89 m/s  Time: 167.8 msec                           PV Peak Gradient: 3.13 mmHg  MV P1/2t: 52.5 msec  LVOT VTI: 21.2 cm     PV Mean Velocity: 0.67 m/s  MVA by PHT:4.19 cm^2 IVRT: 107.3 msec      PV Mean Gradient: 2 mmHg  MV Area              Estimated PASP: 20.32  (continuity): 3.2    mmHg  cm^2  MV E' Septal  Velocity: 0.07 m/s  MV E' Lateral  Velocity: 9 m/s  http://[employer] [email or web address] DocKey=[id number]    Ct Head Wo Contrast    Result Date: 1/7/2021  EXAMINATION: CT OF THE HEAD WITHOUT CONTRAST  1/7/2021 11:52 am TECHNIQUE: CT of the head was performed without the administration of intravenous contrast. Dose modulation, iterative reconstruction, and/or weight based adjustment of the mA/kV was utilized to reduce the radiation dose to as low as reasonably achievable. COMPARISON: None. HISTORY: ORDERING SYSTEM PROVIDED HISTORY: fall TECHNOLOGIST PROVIDED HISTORY: Reason for exam:->fall Has a \"code stroke\" or \"stroke alert\" been called? ->No What reading provider will be dictating this exam?->CRC FINDINGS: BRAIN/VENTRICLES: There is no acute intracranial hemorrhage, mass effect or midline shift. No abnormal extra-axial fluid collection. The gray-white differentiation is maintained without evidence of an acute infarct. There is no evidence of hydrocephalus. Visualize calcifications are seen in the choroid plexus in both the lateral ventricles, 4th ventricular and also in the pineal gland. There is a small hi of calcification in the proximal left A2 segment of the left anterior cerebral artery, relate with there atherosclerotic changes more likely. ORBITS: The visualized portion of the orbits demonstrate no acute abnormality. SINUSES: The visualized paranasal sinuses and mastoid air cells demonstrate no acute abnormality. SOFT TISSUES/SKULL:  No acute abnormality of the visualized skull or soft tissues. No acute intracranial abnormality. Ct Cervical Spine Wo Contrast    Result Date: 1/7/2021  EXAMINATION: CT OF THE CERVICAL SPINE WITHOUT CONTRAST 1/7/2021 11:52 am TECHNIQUE: CT of the cervical spine was performed without the administration of intravenous contrast. Multiplanar reformatted images are provided for review. Dose modulation, iterative reconstruction, and/or weight based adjustment of the mA/kV was utilized to reduce the radiation dose to as low as reasonably achievable. COMPARISON: None. HISTORY: ORDERING SYSTEM PROVIDED HISTORY: fall TECHNOLOGIST PROVIDED HISTORY: Reason for exam:->fall What reading provider will be dictating this exam?->CRC FINDINGS: BONES/ALIGNMENT: There is no acute fracture or traumatic malalignment. DEGENERATIVE CHANGES: No significant degenerative changes. SOFT TISSUES: There is no prevertebral soft tissue swelling. No acute abnormality of the cervical spine. Diffuse degenerative changes are identified from C3-T1 with osteophytes and multilevel disc bulges. 1.2 cm right thyroid nodule is noted. Consider ultrasonographic surveillance.     Xr Chest Portable    Result Date: 1/7/2021  EXAMINATION: ONE XRAY VIEW OF THE CHEST 1/7/2021 11:32 am COMPARISON: None. HISTORY: ORDERING SYSTEM PROVIDED HISTORY: weakness, Possible Stroke TECHNOLOGIST PROVIDED HISTORY: Reason for exam:->weakness, Possible Stroke What reading provider will be dictating this exam?->CRC FINDINGS: Heart and the mediastinal structures are normal.  There is COPD with hyperinflated lungs. Lungs are free of acute infiltrate or pleural effusion. Chronic changes with COPD. There is no focal infiltrates. Nm Cardiac Stress Test Nuclear Imaging    Result Date: 1/8/2021  INDICATION: Syncope PROTOCOL: Rest/stress single isotope SPECT myocardial perfusion imaging with exercise stress and gated SPECT imaging. Reconstruction and reorientation of SPECT images in the short axis, vertical and horizontal long axis. Gated SPECT wall motion study. Qualitative assessment of left ventricular ejection fraction. TECHNIQUE: Stress protocol: [place] Exercise time: 11 minutes Maximum heart rate achieved: 150 BPM Percent max predicted heart rate: 77% Peak systolic blood pressure: 151 mmHg Radiopharmaceutical stress dose: 35 mCi Tc-99m sestamibi Radiopharmaceutical rest dose: 12 mCi Tc-99m sestamibi FINDINGS: The technical quality of the study is fair. Rotating planar images show no significant patient motion. There is diaphragmatic attenuation of the inferior wall noted, as well as extracardiac GI uptake adjacent to the inferolateral wall. The heart appears normal in size on both rest and stress images. Post stress tomographic images demonstrate a moderate-sized area of mildly decreased uptake in the inferior wall extending to the inferoapex. Resting images show no significant reversibility. Inferior wall motion is normal, and thus the perfusion defect is likely due to diaphragmatic attenuation artifact with some apical thinning.  Gated images demonstrate normal wall motion and thickening, with a calculated left ventricular ejection fraction of 61%. End-diastolic volume 300 mL. TID ratio 0.98.    1. Normal myocardial perfusion with diaphragmatic attenuation artifact. 2. No definitive evidence of stress-induced ischemia or prior myocardial infarction. 3. Normal LV function, EF 61%. 4. There is no transient ischemic dilation. 5. Low risk myocardial perfusion study. Us Carotid Artery Bilateral    Result Date: 2021  Patient MRN:  [id number] : 1965 Age: 54 years Gender: Male Order Date:  2021 4:36 PM EXAM: US CAROTID ARTERY BILATERAL NUMBER OF IMAGES:  48 INDICATION:  syncope syncope What reading provider will be dictating this exam?->[facility] COMPARISON: None FINDINGS: Velocities are within normal limits ICA\CCA ratios are  within normal limits The right vertebral artery doppler images demonstrate  antegrade flow. The left vertebral artery doppler images demonstrate  antegrade flow. Grey scale images demonstrate mild plaque identified in the right and left carotid arteries. There is a hypoechoic solid nodule seen in the right lobe of thyroid measuring 1.9 x 1.2 x 1.2 cm    Atherosclerotic disease. No hemodynamically significant stenosis is identified Estimated stenosis by NASCET criteria in the proximal right carotid artery is between 0% and 49%. Estimated stenosis by NASCET criteria in the proximal left carotid artery is between 0% and 49%. Right solid thyroid nodule greater than 1 cm in size.  ALERT:  THIS IS AN ABNORMAL REPORT      Discharge Medications:      Medication List      CONTINUE taking these medications    therapeutic multivitamin-minerals tablet            Time Spent on discharge is more than 45 minutes in the examination, evaluation, counseling and review of medications and discharge plan.    +++++++++++++++++++++++++++++++++++++++++++++++++  Ulysees Cushing, MD  [address]  +++++++++++++++++++++++++++++++++++++++++++++++++  NOTE: This report was transcribed using voice recognition software. Every effort was made to ensure accuracy; however, inadvertent computerized transcription errors may be present.

## 2021-01-09 NOTE — PROGRESS NOTES
Perfect-serve sent to Dr. Rush Cary to see if patient would be ok to discharge from Neurologies standpoint. Will await orders and continue to monitor patient.

## 2021-01-12 ENCOUNTER — TELEPHONE (OUTPATIENT)
Dept: CARDIOLOGY CLINIC | Age: 56
End: 2021-01-12

## 2024-01-12 ENCOUNTER — LAB (OUTPATIENT)
Dept: LAB | Facility: LAB | Age: 59
End: 2024-01-12
Payer: COMMERCIAL

## 2024-01-12 ENCOUNTER — OFFICE VISIT (OUTPATIENT)
Dept: PRIMARY CARE | Facility: CLINIC | Age: 59
End: 2024-01-12
Payer: COMMERCIAL

## 2024-01-12 VITALS
DIASTOLIC BLOOD PRESSURE: 68 MMHG | BODY MASS INDEX: 23.69 KG/M2 | OXYGEN SATURATION: 98 % | HEART RATE: 61 BPM | WEIGHT: 169.4 LBS | TEMPERATURE: 96.4 F | SYSTOLIC BLOOD PRESSURE: 110 MMHG

## 2024-01-12 DIAGNOSIS — Z12.5 SCREENING FOR MALIGNANT NEOPLASM OF PROSTATE: ICD-10-CM

## 2024-01-12 DIAGNOSIS — Z00.00 WELLNESS EXAMINATION: Primary | ICD-10-CM

## 2024-01-12 DIAGNOSIS — Z13.6 SCREENING FOR CARDIOVASCULAR CONDITION: ICD-10-CM

## 2024-01-12 DIAGNOSIS — G44.219 EPISODIC TENSION-TYPE HEADACHE, NOT INTRACTABLE: ICD-10-CM

## 2024-01-12 DIAGNOSIS — Z86.39 HISTORY OF THYROID NODULE: ICD-10-CM

## 2024-01-12 DIAGNOSIS — M79.89 SWELLING OF RIGHT HAND: ICD-10-CM

## 2024-01-12 DIAGNOSIS — D12.3 ADENOMATOUS POLYP OF TRANSVERSE COLON: ICD-10-CM

## 2024-01-12 PROBLEM — E04.1 SOLITARY THYROID NODULE: Status: ACTIVE | Noted: 2024-01-12

## 2024-01-12 LAB
ALBUMIN SERPL BCP-MCNC: 4 G/DL (ref 3.4–5)
ALP SERPL-CCNC: 54 U/L (ref 33–120)
ALT SERPL W P-5'-P-CCNC: 13 U/L (ref 10–52)
ANION GAP SERPL CALC-SCNC: 9 MMOL/L (ref 10–20)
AST SERPL W P-5'-P-CCNC: 15 U/L (ref 9–39)
BILIRUB SERPL-MCNC: 1.2 MG/DL (ref 0–1.2)
BUN SERPL-MCNC: 17 MG/DL (ref 6–23)
CALCIUM SERPL-MCNC: 9.7 MG/DL (ref 8.6–10.3)
CHLORIDE SERPL-SCNC: 104 MMOL/L (ref 98–107)
CHOLEST SERPL-MCNC: 141 MG/DL (ref 0–199)
CHOLESTEROL/HDL RATIO: 3.5
CO2 SERPL-SCNC: 32 MMOL/L (ref 21–32)
CREAT SERPL-MCNC: 0.99 MG/DL (ref 0.5–1.3)
EGFRCR SERPLBLD CKD-EPI 2021: 88 ML/MIN/1.73M*2
GLUCOSE SERPL-MCNC: 86 MG/DL (ref 74–99)
HDLC SERPL-MCNC: 40.5 MG/DL
LDLC SERPL CALC-MCNC: 80 MG/DL
NON HDL CHOLESTEROL: 101 MG/DL (ref 0–149)
POTASSIUM SERPL-SCNC: 4.9 MMOL/L (ref 3.5–5.3)
PROT SERPL-MCNC: 5.7 G/DL (ref 6.4–8.2)
PSA SERPL-MCNC: 2.1 NG/ML
SODIUM SERPL-SCNC: 140 MMOL/L (ref 136–145)
TRIGL SERPL-MCNC: 101 MG/DL (ref 0–149)
TSH SERPL-ACNC: 1.42 MIU/L (ref 0.44–3.98)
VLDL: 20 MG/DL (ref 0–40)

## 2024-01-12 PROCEDURE — 99214 OFFICE O/P EST MOD 30 MIN: CPT | Performed by: INTERNAL MEDICINE

## 2024-01-12 PROCEDURE — 84153 ASSAY OF PSA TOTAL: CPT

## 2024-01-12 PROCEDURE — 80061 LIPID PANEL: CPT

## 2024-01-12 PROCEDURE — 99396 PREV VISIT EST AGE 40-64: CPT | Performed by: INTERNAL MEDICINE

## 2024-01-12 PROCEDURE — 36415 COLL VENOUS BLD VENIPUNCTURE: CPT

## 2024-01-12 PROCEDURE — 80053 COMPREHEN METABOLIC PANEL: CPT

## 2024-01-12 PROCEDURE — 84443 ASSAY THYROID STIM HORMONE: CPT

## 2024-01-12 ASSESSMENT — ENCOUNTER SYMPTOMS
NEUROLOGICAL NEGATIVE: 1
CONSTITUTIONAL NEGATIVE: 1
ALLERGIC/IMMUNOLOGIC NEGATIVE: 1
HEMATOLOGIC/LYMPHATIC NEGATIVE: 1
MUSCULOSKELETAL NEGATIVE: 1
GASTROINTESTINAL NEGATIVE: 1
PSYCHIATRIC NEGATIVE: 1
CARDIOVASCULAR NEGATIVE: 1
RESPIRATORY NEGATIVE: 1
ENDOCRINE NEGATIVE: 1
EYES NEGATIVE: 1

## 2024-01-12 NOTE — PROGRESS NOTES
Subjective   Patient ID: Dedrick Pacheco is a 58 y.o. male who presents for physical , migrains , swollen hand , and past thyroid nodule .  Patient presents for wellness exam.  He was last seen 1/2022 at which time he was doing well.  He had colonoscopy in 11/2022 which was positive for tubular adenoma in the transverse colon.  He also has past hx of thyroid nodule which was shown to be small hyperfunctioning nodule on uptake scan in otherwise euthyroid gland confirmed by lab.    He has complaint today of his right hand being swollen.  He states this usually is noticeable in the morning when he gets out of bed.  He states the last few mornings it has been normal.  Exam is negative.        Review of Systems   Constitutional: Negative.    HENT: Negative.     Eyes: Negative.    Respiratory: Negative.     Cardiovascular: Negative.    Gastrointestinal: Negative.    Endocrine: Negative.    Genitourinary: Negative.    Musculoskeletal: Negative.    Skin: Negative.    Allergic/Immunologic: Negative.    Neurological: Negative.    Hematological: Negative.    Psychiatric/Behavioral: Negative.         Objective     Blood pressure 110/68, pulse 61, temperature 35.8 °C (96.4 °F), temperature source Temporal, weight 76.8 kg (169 lb 6.4 oz), SpO2 98 %.   Physical Exam  Vitals reviewed.   Constitutional:       Appearance: Normal appearance.   Neck:      Vascular: No carotid bruit.   Cardiovascular:      Rate and Rhythm: Normal rate and regular rhythm.      Pulses: Normal pulses.      Heart sounds: Normal heart sounds. No murmur heard.  Pulmonary:      Effort: Pulmonary effort is normal.      Breath sounds: Normal breath sounds. No wheezing or rales.   Abdominal:      General: Abdomen is flat. There is no distension.      Palpations: Abdomen is soft.      Tenderness: There is no abdominal tenderness.   Musculoskeletal:         General: Normal range of motion.      Cervical back: Normal range of motion and neck supple.   Skin:      General: Skin is warm and dry.   Neurological:      General: No focal deficit present.      Mental Status: He is alert and oriented to person, place, and time.   Psychiatric:         Mood and Affect: Mood normal.         Behavior: Behavior normal.         Assessment/Plan   Problem List Items Addressed This Visit       Adenomatous polyp of transverse colon    History of thyroid nodule    Relevant Orders    TSH with reflex to Free T4 if abnormal    NM thyroid uptake and scan     Other Visit Diagnoses       Wellness examination    -  Primary    Episodic tension-type headache, not intractable        Screening for cardiovascular condition        Relevant Orders    Lipid Panel    Comprehensive Metabolic Panel    Screening for malignant neoplasm of prostate        Relevant Orders    Prostate Specific Antigen, Screen    Swelling of right hand              No new or acute findings on wellness exam.  I believe the hand swelling is related to position while sleeping and is not an acute medical issue.  He is reassured.  The cephalgia is consistent with tension headache as they occur in the posterior neck and he is a .  He is advised on ROM   Need to recheck thyroid scan and TFT's.  Will also check screening labs.  In re:  colon polyp, I expect he will need recheck in 11/2025.    Follow up annually or prn

## 2024-01-25 ENCOUNTER — TELEPHONE (OUTPATIENT)
Dept: PRIMARY CARE | Facility: CLINIC | Age: 59
End: 2024-01-25
Payer: COMMERCIAL

## 2024-01-25 ENCOUNTER — HOSPITAL ENCOUNTER (OUTPATIENT)
Dept: RADIOLOGY | Facility: HOSPITAL | Age: 59
Discharge: HOME | End: 2024-01-25
Payer: COMMERCIAL

## 2024-01-25 DIAGNOSIS — Z86.39 HISTORY OF THYROID NODULE: ICD-10-CM

## 2024-01-25 NOTE — TELEPHONE ENCOUNTER
Juliette, from [facility] called in and stated that the patient arrived for his Thyroid Uptake and Scan and refused  the scan.

## 2024-01-26 ENCOUNTER — APPOINTMENT (OUTPATIENT)
Dept: RADIOLOGY | Facility: HOSPITAL | Age: 59
End: 2024-01-26
Payer: COMMERCIAL

## 2024-01-30 ENCOUNTER — OFFICE VISIT (OUTPATIENT)
Dept: PRIMARY CARE | Facility: CLINIC | Age: 59
End: 2024-01-30
Payer: COMMERCIAL

## 2024-01-30 VITALS
HEART RATE: 61 BPM | OXYGEN SATURATION: 92 % | WEIGHT: 170.4 LBS | SYSTOLIC BLOOD PRESSURE: 110 MMHG | BODY MASS INDEX: 23.83 KG/M2 | DIASTOLIC BLOOD PRESSURE: 72 MMHG | TEMPERATURE: 97.2 F

## 2024-01-30 DIAGNOSIS — R42 DIZZINESS: ICD-10-CM

## 2024-01-30 DIAGNOSIS — E04.1 SOLITARY THYROID NODULE: ICD-10-CM

## 2024-01-30 DIAGNOSIS — H53.9 VISUAL CHANGES: Primary | ICD-10-CM

## 2024-01-30 PROCEDURE — 99214 OFFICE O/P EST MOD 30 MIN: CPT | Performed by: INTERNAL MEDICINE

## 2024-01-30 RX ORDER — ASPIRIN 81 MG/1
81 TABLET ORAL DAILY
Qty: 30 TABLET | Refills: 11
Start: 2024-01-30

## 2024-01-30 ASSESSMENT — ENCOUNTER SYMPTOMS
NEUROLOGICAL NEGATIVE: 1
ENDOCRINE NEGATIVE: 1
HEMATOLOGIC/LYMPHATIC NEGATIVE: 1
CONSTITUTIONAL NEGATIVE: 1
PSYCHIATRIC NEGATIVE: 1
GASTROINTESTINAL NEGATIVE: 1
CARDIOVASCULAR NEGATIVE: 1
ALLERGIC/IMMUNOLOGIC NEGATIVE: 1
EYES NEGATIVE: 1
RESPIRATORY NEGATIVE: 1
MUSCULOSKELETAL NEGATIVE: 1

## 2024-01-30 NOTE — PROGRESS NOTES
Subjective   Patient ID: Dedrick Pacheco is a 58 y.o. male who presents for has questions .  Patient presents today to discuss lab results even though he was informed that all his results were excellent.  We again reviewed all testing performed and he was reassured.    He did not get the thyroid uptake done stating that the tech at the hospital told him that his TSH was normal so didn't know why he would be getting the exam.  This, despite the documentation that he has a hx of a hyperfunctioning nodule on uptake 2 years ago that needs observation.    He additionally describes an episode a couple of Saturdays ago when he was walking to his car of inability to get his eyes to focus with dizziness.  He sat down in his car and subsequently had emesis.  He states he doesn't know how long he sat in his car before going back in the house and then had a headache the rest of the day.  He states he had a similar episode a couple of years ago.        Review of Systems   Constitutional: Negative.    HENT: Negative.     Eyes: Negative.    Respiratory: Negative.     Cardiovascular: Negative.    Gastrointestinal: Negative.    Endocrine: Negative.    Genitourinary: Negative.    Musculoskeletal: Negative.    Skin: Negative.    Allergic/Immunologic: Negative.    Neurological: Negative.         See HPI   Hematological: Negative.    Psychiatric/Behavioral: Negative.         Objective     Blood pressure 110/72, pulse 61, temperature 36.2 °C (97.2 °F), temperature source Temporal, weight 77.3 kg (170 lb 6.4 oz), SpO2 92 %.   Physical Exam  Vitals reviewed.   Constitutional:       Appearance: Normal appearance.   Neck:      Vascular: No carotid bruit.   Cardiovascular:      Rate and Rhythm: Normal rate and regular rhythm.      Pulses: Normal pulses.      Heart sounds: Normal heart sounds. No murmur heard.  Pulmonary:      Effort: Pulmonary effort is normal.      Breath sounds: Normal breath sounds. No wheezing or rales.   Abdominal:       General: Abdomen is flat. There is no distension.      Palpations: Abdomen is soft.      Tenderness: There is no abdominal tenderness.   Musculoskeletal:         General: Normal range of motion.      Cervical back: Normal range of motion and neck supple.   Skin:     General: Skin is warm and dry.   Neurological:      General: No focal deficit present.      Mental Status: He is alert and oriented to person, place, and time.   Psychiatric:         Mood and Affect: Mood normal.         Behavior: Behavior normal.         Assessment/Plan   Problem List Items Addressed This Visit       Solitary thyroid nodule     Other Visit Diagnoses       Visual changes    -  Primary    Relevant Orders    Vascular US Carotid Artery Duplex Bilateral    Dizziness        Relevant Orders    Vascular US Carotid Artery Duplex Bilateral          He is advised to get thyroid uptake done as ordered as follow up on the known nodule.  In re:  episode described in HPI, he is advised to have carotid check and to begin 81 mg ASA daily.    > 30 minutes was spent with the patient today, the majority of which was spent on review of history and medications as well as counselling on care plan and/or coordination of care.     Otherwise plan to follow up as scheduled or prn

## 2024-02-09 ENCOUNTER — HOSPITAL ENCOUNTER (OUTPATIENT)
Dept: VASCULAR MEDICINE | Facility: HOSPITAL | Age: 59
Discharge: HOME | End: 2024-02-09
Payer: COMMERCIAL

## 2024-02-09 DIAGNOSIS — R09.89 OTHER SPECIFIED SYMPTOMS AND SIGNS INVOLVING THE CIRCULATORY AND RESPIRATORY SYSTEMS: ICD-10-CM

## 2024-02-09 DIAGNOSIS — H53.9 VISUAL CHANGES: ICD-10-CM

## 2024-02-09 DIAGNOSIS — R42 DIZZINESS: ICD-10-CM

## 2024-02-09 PROCEDURE — 93880 EXTRACRANIAL BILAT STUDY: CPT

## 2024-02-09 PROCEDURE — 93880 EXTRACRANIAL BILAT STUDY: CPT | Performed by: INTERNAL MEDICINE

## 2024-02-13 ENCOUNTER — HOSPITAL ENCOUNTER (OUTPATIENT)
Dept: RADIOLOGY | Facility: HOSPITAL | Age: 59
Discharge: HOME | End: 2024-02-13
Payer: COMMERCIAL

## 2024-02-13 PROCEDURE — 78014 THYROID IMAGING W/BLOOD FLOW: CPT

## 2024-02-13 PROCEDURE — A9516 IODINE I-123 SOD IODIDE MIC: HCPCS | Performed by: RADIOLOGY

## 2024-02-13 PROCEDURE — 3430000001 HC RX 343 DIAGNOSTIC RADIOPHARMACEUTICALS: Performed by: RADIOLOGY

## 2024-02-13 RX ADMIN — SODIUM IODIDE I 123 400 MICROCURIE: 200 CAPSULE, GELATIN COATED ORAL at 12:10

## 2024-02-14 ENCOUNTER — HOSPITAL ENCOUNTER (OUTPATIENT)
Dept: RADIOLOGY | Facility: HOSPITAL | Age: 59
Discharge: HOME | End: 2024-02-14
Payer: COMMERCIAL

## 2024-02-14 PROCEDURE — 78014 THYROID IMAGING W/BLOOD FLOW: CPT | Performed by: STUDENT IN AN ORGANIZED HEALTH CARE EDUCATION/TRAINING PROGRAM

## 2024-02-14 RX ORDER — SODIUM IODIDE I 123 200 UCI/1
400 CAPSULE, GELATIN COATED ORAL
Status: COMPLETED | OUTPATIENT
Start: 2024-02-13 | End: 2024-02-13

## 2025-01-17 ENCOUNTER — APPOINTMENT (OUTPATIENT)
Dept: PRIMARY CARE | Facility: CLINIC | Age: 60
End: 2025-01-17
Payer: COMMERCIAL

## 2025-01-28 ENCOUNTER — APPOINTMENT (OUTPATIENT)
Dept: PRIMARY CARE | Facility: CLINIC | Age: 60
End: 2025-01-28
Payer: COMMERCIAL

## 2025-02-07 ENCOUNTER — APPOINTMENT (OUTPATIENT)
Dept: PRIMARY CARE | Facility: CLINIC | Age: 60
End: 2025-02-07
Payer: COMMERCIAL

## 2025-02-07 VITALS
SYSTOLIC BLOOD PRESSURE: 100 MMHG | HEART RATE: 66 BPM | OXYGEN SATURATION: 96 % | TEMPERATURE: 97.5 F | BODY MASS INDEX: 24.33 KG/M2 | DIASTOLIC BLOOD PRESSURE: 58 MMHG | WEIGHT: 174 LBS

## 2025-02-07 DIAGNOSIS — Z86.39 HISTORY OF THYROID NODULE: ICD-10-CM

## 2025-02-07 DIAGNOSIS — D12.3 ADENOMATOUS POLYP OF TRANSVERSE COLON: ICD-10-CM

## 2025-02-07 DIAGNOSIS — Z00.00 WELLNESS EXAMINATION: Primary | ICD-10-CM

## 2025-02-07 DIAGNOSIS — Z13.6 SCREENING FOR CARDIOVASCULAR CONDITION: ICD-10-CM

## 2025-02-07 DIAGNOSIS — Z12.5 SCREENING FOR MALIGNANT NEOPLASM OF PROSTATE: ICD-10-CM

## 2025-02-07 DIAGNOSIS — R21 RASH: ICD-10-CM

## 2025-02-07 PROCEDURE — 99396 PREV VISIT EST AGE 40-64: CPT | Performed by: INTERNAL MEDICINE

## 2025-02-07 PROCEDURE — 99213 OFFICE O/P EST LOW 20 MIN: CPT | Performed by: INTERNAL MEDICINE

## 2025-02-07 ASSESSMENT — ENCOUNTER SYMPTOMS
RESPIRATORY NEGATIVE: 1
ENDOCRINE NEGATIVE: 1
CONSTITUTIONAL NEGATIVE: 1
CARDIOVASCULAR NEGATIVE: 1
MUSCULOSKELETAL NEGATIVE: 1
HEMATOLOGIC/LYMPHATIC NEGATIVE: 1
PSYCHIATRIC NEGATIVE: 1
EYES NEGATIVE: 1
ALLERGIC/IMMUNOLOGIC NEGATIVE: 1
GASTROINTESTINAL NEGATIVE: 1
NEUROLOGICAL NEGATIVE: 1

## 2025-02-07 ASSESSMENT — PATIENT HEALTH QUESTIONNAIRE - PHQ9
2. FEELING DOWN, DEPRESSED OR HOPELESS: NOT AT ALL
SUM OF ALL RESPONSES TO PHQ9 QUESTIONS 1 AND 2: 0
1. LITTLE INTEREST OR PLEASURE IN DOING THINGS: NOT AT ALL

## 2025-02-07 NOTE — PROGRESS NOTES
Subjective   Patient ID: Dedrick Pacheco is a 59 y.o. male who presents for annual follow up .  Patient presents for annual wellness exam.  He states that he has been feeling and doing well without any complaints.    Chart review reveals that testing last year showed persistence of hyperfunctioning thyroid nodule but otherwise normal testing.        Review of Systems   Constitutional: Negative.    HENT: Negative.     Eyes: Negative.    Respiratory: Negative.     Cardiovascular: Negative.    Gastrointestinal: Negative.    Endocrine: Negative.    Genitourinary: Negative.    Musculoskeletal: Negative.    Skin: Negative.    Allergic/Immunologic: Negative.    Neurological: Negative.    Hematological: Negative.    Psychiatric/Behavioral: Negative.         Objective     Blood pressure 100/58, pulse 66, temperature 36.4 °C (97.5 °F), temperature source Temporal, weight 78.9 kg (174 lb), SpO2 96%.   Physical Exam  Vitals reviewed.   Constitutional:       Appearance: Normal appearance.   Neck:      Vascular: No carotid bruit.   Cardiovascular:      Rate and Rhythm: Normal rate and regular rhythm.      Pulses: Normal pulses.      Heart sounds: Normal heart sounds. No murmur heard.  Pulmonary:      Effort: Pulmonary effort is normal.      Breath sounds: Normal breath sounds. No wheezing or rales.   Abdominal:      General: Abdomen is flat. There is no distension.      Palpations: Abdomen is soft.      Tenderness: There is no abdominal tenderness.   Musculoskeletal:         General: Normal range of motion.      Cervical back: Normal range of motion and neck supple.   Skin:     General: Skin is warm and dry.   Neurological:      General: No focal deficit present.      Mental Status: He is alert and oriented to person, place, and time.   Psychiatric:         Mood and Affect: Mood normal.         Behavior: Behavior normal.         Assessment/Plan   Problem List Items Addressed This Visit       Adenomatous polyp of transverse colon     History of thyroid nodule    Relevant Orders    TSH with reflex to Free T4 if abnormal    NM thyroid uptake and scan     Other Visit Diagnoses       Wellness examination    -  Primary    Screening for cardiovascular condition        Relevant Orders    Comprehensive Metabolic Panel    Lipid Panel    Screening for malignant neoplasm of prostate        Relevant Orders    Prostate Specific Antigen, Screen    Rash        Relevant Orders    Referral to Dermatology          No new or acute issues identified on wellness exam.  He is up to date on preventive measures.  He is advised to get thyroid uptake rechecked as ordered as follow up on the known nodule.  I note that he will be due later this year for follow up colonoscopy and he is reminded to be sure he is able to get this done.  He has a skin change on the left outer thigh and calf with the thigh being worse.  He states he developed a blood red rash that looked like a spider web.  It has improved and is now a brown reticulo-endothelial skin change.  He admits that he had been doing squats prior to onset.  That may be etiology.  Will refer to Derm which he might cancel if resolves.       Follow up annually

## 2025-03-17 LAB
ALBUMIN SERPL-MCNC: 4.4 G/DL (ref 3.6–5.1)
ALP SERPL-CCNC: 50 U/L (ref 35–144)
ALT SERPL-CCNC: 14 U/L (ref 9–46)
ANION GAP SERPL CALCULATED.4IONS-SCNC: 7 MMOL/L (CALC) (ref 7–17)
AST SERPL-CCNC: 16 U/L (ref 10–35)
BILIRUB SERPL-MCNC: 1.3 MG/DL (ref 0.2–1.2)
BUN SERPL-MCNC: 18 MG/DL (ref 7–25)
CALCIUM SERPL-MCNC: 9.4 MG/DL (ref 8.6–10.3)
CHLORIDE SERPL-SCNC: 102 MMOL/L (ref 98–110)
CHOLEST SERPL-MCNC: 161 MG/DL
CHOLEST/HDLC SERPL: 3.4 (CALC)
CO2 SERPL-SCNC: 31 MMOL/L (ref 20–32)
CREAT SERPL-MCNC: 0.91 MG/DL (ref 0.7–1.3)
EGFRCR SERPLBLD CKD-EPI 2021: 97 ML/MIN/1.73M2
GLUCOSE SERPL-MCNC: 97 MG/DL (ref 65–99)
HDLC SERPL-MCNC: 48 MG/DL
LDLC SERPL CALC-MCNC: 94 MG/DL (CALC)
NONHDLC SERPL-MCNC: 113 MG/DL (CALC)
POTASSIUM SERPL-SCNC: 4.5 MMOL/L (ref 3.5–5.3)
PROT SERPL-MCNC: 6.1 G/DL (ref 6.1–8.1)
PSA SERPL-MCNC: 2.88 NG/ML
SODIUM SERPL-SCNC: 140 MMOL/L (ref 135–146)
TRIGL SERPL-MCNC: 99 MG/DL
TSH SERPL-ACNC: 2.16 MIU/L (ref 0.4–4.5)

## 2025-09-16 ENCOUNTER — APPOINTMENT (OUTPATIENT)
Dept: DERMATOLOGY | Facility: CLINIC | Age: 60
End: 2025-09-16
Payer: COMMERCIAL

## 2026-02-13 ENCOUNTER — APPOINTMENT (OUTPATIENT)
Dept: PRIMARY CARE | Facility: CLINIC | Age: 61
End: 2026-02-13
Payer: COMMERCIAL